# Patient Record
Sex: MALE | Race: WHITE | NOT HISPANIC OR LATINO | Employment: FULL TIME | URBAN - METROPOLITAN AREA
[De-identification: names, ages, dates, MRNs, and addresses within clinical notes are randomized per-mention and may not be internally consistent; named-entity substitution may affect disease eponyms.]

---
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
## 2021-01-17 ENCOUNTER — HOSPITAL ENCOUNTER (EMERGENCY)
Facility: CLINIC | Age: 21
Discharge: HOME | End: 2021-01-18
Attending: FAMILY MEDICINE | Admitting: FAMILY MEDICINE
Payer: COMMERCIAL

## 2021-01-17 ENCOUNTER — APPOINTMENT (OUTPATIENT)
Dept: CT IMAGING | Facility: CLINIC | Age: 21
End: 2021-01-17
Attending: FAMILY MEDICINE
Payer: COMMERCIAL

## 2021-01-17 DIAGNOSIS — G93.41 METABOLIC ENCEPHALOPATHY: ICD-10-CM

## 2021-01-17 DIAGNOSIS — F10.929 ALCOHOLIC INTOXICATION WITH COMPLICATION (H): ICD-10-CM

## 2021-01-17 DIAGNOSIS — R45.1 AGITATION: ICD-10-CM

## 2021-01-17 LAB
ALBUMIN SERPL-MCNC: 4.4 G/DL (ref 3.4–5)
ALBUMIN UR-MCNC: NEGATIVE MG/DL
ALP SERPL-CCNC: 65 U/L (ref 65–260)
ALT SERPL W P-5'-P-CCNC: 39 U/L (ref 0–50)
AMPHETAMINES UR QL SCN: NEGATIVE
ANION GAP SERPL CALCULATED.3IONS-SCNC: 6 MMOL/L (ref 3–14)
APPEARANCE UR: CLEAR
AST SERPL W P-5'-P-CCNC: 33 U/L (ref 0–35)
BARBITURATES UR QL: NEGATIVE
BASOPHILS # BLD AUTO: 0 10E9/L (ref 0–0.2)
BASOPHILS NFR BLD AUTO: 0.5 %
BENZODIAZ UR QL: NEGATIVE
BILIRUB SERPL-MCNC: 1.2 MG/DL (ref 0.2–1.3)
BILIRUB UR QL STRIP: NEGATIVE
BUN SERPL-MCNC: 9 MG/DL (ref 7–30)
CALCIUM SERPL-MCNC: 8.9 MG/DL (ref 8.5–10.1)
CANNABINOIDS UR QL SCN: NEGATIVE
CHLORIDE SERPL-SCNC: 110 MMOL/L (ref 98–110)
CO2 SERPL-SCNC: 26 MMOL/L (ref 20–32)
COCAINE UR QL: NEGATIVE
COLOR UR AUTO: ABNORMAL
CREAT SERPL-MCNC: 0.91 MG/DL (ref 0.5–1)
DIFFERENTIAL METHOD BLD: NORMAL
EOSINOPHIL # BLD AUTO: 0.1 10E9/L (ref 0–0.7)
EOSINOPHIL NFR BLD AUTO: 2 %
ERYTHROCYTE [DISTWIDTH] IN BLOOD BY AUTOMATED COUNT: 11.9 % (ref 10–15)
ETHANOL SERPL-MCNC: 0.27 G/DL
GFR SERPL CREATININE-BSD FRML MDRD: >90 ML/MIN/{1.73_M2}
GLUCOSE BLDC GLUCOMTR-MCNC: 78 MG/DL (ref 70–99)
GLUCOSE SERPL-MCNC: 95 MG/DL (ref 70–99)
GLUCOSE UR STRIP-MCNC: NEGATIVE MG/DL
HCT VFR BLD AUTO: 46.1 % (ref 40–53)
HGB BLD-MCNC: 15.7 G/DL (ref 13.3–17.7)
HGB UR QL STRIP: NEGATIVE
IMM GRANULOCYTES # BLD: 0 10E9/L (ref 0–0.4)
IMM GRANULOCYTES NFR BLD: 0.2 %
KETONES UR STRIP-MCNC: NEGATIVE MG/DL
LEUKOCYTE ESTERASE UR QL STRIP: NEGATIVE
LYMPHOCYTES # BLD AUTO: 1.7 10E9/L (ref 0.8–5.3)
LYMPHOCYTES NFR BLD AUTO: 30.8 %
MCH RBC QN AUTO: 30.3 PG (ref 26.5–33)
MCHC RBC AUTO-ENTMCNC: 34.1 G/DL (ref 31.5–36.5)
MCV RBC AUTO: 89 FL (ref 78–100)
MONOCYTES # BLD AUTO: 0.5 10E9/L (ref 0–1.3)
MONOCYTES NFR BLD AUTO: 8.9 %
NEUTROPHILS # BLD AUTO: 3.2 10E9/L (ref 1.6–8.3)
NEUTROPHILS NFR BLD AUTO: 57.6 %
NITRATE UR QL: NEGATIVE
NRBC # BLD AUTO: 0 10*3/UL
NRBC BLD AUTO-RTO: 0 /100
OPIATES UR QL SCN: NEGATIVE
PCP UR QL SCN: NEGATIVE
PH UR STRIP: 6 PH (ref 5–7)
PLATELET # BLD AUTO: 279 10E9/L (ref 150–450)
POTASSIUM SERPL-SCNC: 3.7 MMOL/L (ref 3.4–5.3)
PROT SERPL-MCNC: 8 G/DL (ref 6.8–8.8)
RBC # BLD AUTO: 5.18 10E12/L (ref 4.4–5.9)
SODIUM SERPL-SCNC: 142 MMOL/L (ref 133–144)
SOURCE: ABNORMAL
SP GR UR STRIP: 1 (ref 1–1.03)
TROPONIN I SERPL-MCNC: <0.015 UG/L (ref 0–0.04)
UROBILINOGEN UR STRIP-MCNC: 0 MG/DL (ref 0–2)
WBC # BLD AUTO: 5.6 10E9/L (ref 4–11)

## 2021-01-17 PROCEDURE — 80307 DRUG TEST PRSMV CHEM ANLYZR: CPT | Performed by: FAMILY MEDICINE

## 2021-01-17 PROCEDURE — 250N000011 HC RX IP 250 OP 636

## 2021-01-17 PROCEDURE — 82077 ASSAY SPEC XCP UR&BREATH IA: CPT | Performed by: FAMILY MEDICINE

## 2021-01-17 PROCEDURE — 96375 TX/PRO/DX INJ NEW DRUG ADDON: CPT | Performed by: FAMILY MEDICINE

## 2021-01-17 PROCEDURE — 85025 COMPLETE CBC W/AUTO DIFF WBC: CPT | Performed by: FAMILY MEDICINE

## 2021-01-17 PROCEDURE — 93010 ELECTROCARDIOGRAM REPORT: CPT | Performed by: FAMILY MEDICINE

## 2021-01-17 PROCEDURE — 72125 CT NECK SPINE W/O DYE: CPT

## 2021-01-17 PROCEDURE — 99291 CRITICAL CARE FIRST HOUR: CPT | Performed by: FAMILY MEDICINE

## 2021-01-17 PROCEDURE — 93005 ELECTROCARDIOGRAM TRACING: CPT | Performed by: FAMILY MEDICINE

## 2021-01-17 PROCEDURE — 99285 EMERGENCY DEPT VISIT HI MDM: CPT | Mod: 25 | Performed by: FAMILY MEDICINE

## 2021-01-17 PROCEDURE — 96374 THER/PROPH/DIAG INJ IV PUSH: CPT | Performed by: FAMILY MEDICINE

## 2021-01-17 PROCEDURE — 70450 CT HEAD/BRAIN W/O DYE: CPT

## 2021-01-17 PROCEDURE — 999N001017 HC STATISTIC GLUCOSE BY METER IP

## 2021-01-17 PROCEDURE — 84484 ASSAY OF TROPONIN QUANT: CPT | Performed by: FAMILY MEDICINE

## 2021-01-17 PROCEDURE — 81003 URINALYSIS AUTO W/O SCOPE: CPT | Performed by: FAMILY MEDICINE

## 2021-01-17 PROCEDURE — 80053 COMPREHEN METABOLIC PANEL: CPT | Performed by: FAMILY MEDICINE

## 2021-01-17 PROCEDURE — 99292 CRITICAL CARE ADDL 30 MIN: CPT | Mod: 25 | Performed by: FAMILY MEDICINE

## 2021-01-17 PROCEDURE — 250N000011 HC RX IP 250 OP 636: Performed by: FAMILY MEDICINE

## 2021-01-17 RX ORDER — DROPERIDOL 2.5 MG/ML
2.5 INJECTION, SOLUTION INTRAMUSCULAR; INTRAVENOUS ONCE
Status: COMPLETED | OUTPATIENT
Start: 2021-01-17 | End: 2021-01-17

## 2021-01-17 RX ORDER — LORAZEPAM 2 MG/ML
INJECTION INTRAMUSCULAR
Status: COMPLETED
Start: 2021-01-17 | End: 2021-01-17

## 2021-01-17 RX ORDER — LORAZEPAM 2 MG/ML
INJECTION INTRAMUSCULAR
Status: DISCONTINUED
Start: 2021-01-17 | End: 2021-01-17 | Stop reason: WASHOUT

## 2021-01-17 RX ORDER — LORAZEPAM 2 MG/ML
2 INJECTION INTRAMUSCULAR ONCE
Status: DISCONTINUED | OUTPATIENT
Start: 2021-01-17 | End: 2021-01-18 | Stop reason: HOSPADM

## 2021-01-17 RX ORDER — LORAZEPAM 2 MG/ML
2 INJECTION INTRAMUSCULAR ONCE
Status: COMPLETED | OUTPATIENT
Start: 2021-01-17 | End: 2021-01-17

## 2021-01-17 RX ADMIN — LORAZEPAM 2 MG: 2 INJECTION INTRAMUSCULAR at 20:15

## 2021-01-17 RX ADMIN — LORAZEPAM 2 MG: 2 INJECTION INTRAMUSCULAR; INTRAVENOUS at 20:15

## 2021-01-17 RX ADMIN — DROPERIDOL 2.5 MG: 2.5 INJECTION, SOLUTION INTRAMUSCULAR; INTRAVENOUS at 22:17

## 2021-01-18 VITALS
TEMPERATURE: 97.9 F | OXYGEN SATURATION: 90 % | DIASTOLIC BLOOD PRESSURE: 70 MMHG | HEART RATE: 107 BPM | SYSTOLIC BLOOD PRESSURE: 136 MMHG | RESPIRATION RATE: 17 BRPM

## 2021-01-18 LAB
ACETAMINOPHEN QUAL: NEGATIVE
AMOBARBITAL QUAL: NEGATIVE
BARBITAL QUAL: NEGATIVE
BUTABARBITAL QUAL: NEGATIVE
BUTALBITAL QUAL: NEGATIVE
CAFFEINE QUAL: POSITIVE
CARBAMAZEPINE QUAL: NEGATIVE
CARISOPRODOL QUAL: NEGATIVE
CHLORPROPAMIDE UR-MCNC: NEGATIVE UG/ML
DRUGS SERPL SCN: NEGATIVE
ETHCLORVYNOL QUAL: NEGATIVE
ETHINAMATE QUAL: NEGATIVE
ETHOSUXIMIDE QUAL: NEGATIVE
ETHOTOIN QUAL: NEGATIVE
GLUTETHIMIDE QUAL: NEGATIVE
IBUPROFEN QUAL: NEGATIVE
MEPHENYTOIN QUAL: NEGATIVE
MEPHOBARBITAL QUAL: NEGATIVE
MEPROBAMATE QUAL: NEGATIVE
METHAQUALONE QUAL: NEGATIVE
METHARBITAL QUAL: NEGATIVE
METHSUXIMIDE QUAL: NEGATIVE
METHYPRYLON QUAL: NEGATIVE
PENTOBARBITAL QUAL: NEGATIVE
PHENACETIN QUAL: NEGATIVE
PHENOBARBITAL QUAL: NEGATIVE
PHENSUXIMIDE QUAL: NEGATIVE
PHENYTOIN QUAL: NEGATIVE
PRIMIDONE QUAL: NEGATIVE
SALICYLATE QUAL: NEGATIVE
SECOBARBITAL QUAL: NEGATIVE
TALBUTAL QUAL: NEGATIVE
THEOPHYLLINE QUAL: NEGATIVE
THIOPENTAL QUAL: NEGATIVE
TYBAMATE QUAL: NEGATIVE
VALPROIC ACID QUAL: NEGATIVE

## 2021-01-18 PROCEDURE — 90791 PSYCH DIAGNOSTIC EVALUATION: CPT
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
## 2021-01-18 NOTE — ED PROVIDER NOTES
Emergency Department Psychiatric Patient Sign-out       Brief HPI:  This is a 20 year old male signed out to me by Dr. Cordero .  See initial ED Provider note for details of the presentation.  This is a patient who presented via EMS intoxicated and aggressive, received ketamine from EMS and lorazepam here as well as IV droperidol.  Very aggressive and lashing out at staff here initially, now sleeping comfortably.  CT of the head is negative for intracranial injury.  Reassuring vital signs and now.  The plan is to have DEC evaluation once clinically sober.    Patient is not medically cleared for admission to a Behavioral Health unit.      Pending studies include NA.      The patient is on a hold.  The type of hold is Transport.      The patient has required medication for agitation.    Medications   LORazepam (ATIVAN) injection 2 mg (2 mg Intravenous Not Given 1/17/21 2327)   LORazepam (ATIVAN) injection 2 mg (2 mg Intravenous Given 1/17/21 2015)   droperidol (INAPSINE) injection 2.5 mg (2.5 mg Intravenous Given 1/17/21 2217)       Exam:   Patient Vitals for the past 24 hrs:   BP Temp Temp src Pulse Resp SpO2   01/17/21 2300 102/42 -- -- 79 15 96 %   01/17/21 2245 107/45 -- -- 77 14 95 %   01/17/21 2230 125/59 -- -- 90 16 95 %   01/17/21 2200 116/77 -- -- 84 15 --   01/17/21 2145 115/78 -- -- 77 16 98 %   01/17/21 2130 114/73 -- -- 112 23 99 %   01/17/21 2120 -- -- -- 95 15 100 %   01/17/21 2115 (!) 124/97 -- -- 79 15 100 %   01/17/21 2100 120/68 -- -- 79 22 --   01/17/21 2045 128/75 -- -- 81 11 --   01/17/21 2036 108/82 97.9  F (36.6  C) Axillary 75 18 100 %   01/17/21 2030 120/67 -- -- 69 18 99 %   01/17/21 2025 127/85 -- -- 71 15 99 %   01/17/21 2010 108/82 -- -- 61 9 95 %         ED Course:    The patient was rechecked and reassessed multiple times.  He remained stable here and woke up early in the morning, was calm, cooperative, and appropriate here.  He denies headache, nausea, vomiting, chest pain,  difficulty breathing, or abdominal pain.  He is evaluated by DEC and at this point he denies suicidal ideation, homicidal ideation, and does not require inpatient psychiatric management.  He was counseled on alcohol cessation and if follow-up therapy appointment was scheduled for him.    Patient was signed out to the oncoming provider. Dr. Navarro.  He is safe to discharge as soon as he is able to find a ride.      Impression:    ICD-10-CM    1. Alcoholic intoxication with complication (H)  F10.929    2. Agitation  R45.1    3. Metabolic encephalopathy  G93.41        Plan:    1. Await Transfer to [facility]      RESULTS:   Results for orders placed or performed during the hospital encounter of 01/17/21 (from the past 24 hour(s))   Glucose by meter     Status: None    Collection Time: 01/17/21  8:16 PM   Result Value Ref Range    Glucose 78 70 - 99 mg/dL   CBC with platelets differential     Status: None    Collection Time: 01/17/21  8:17 PM   Result Value Ref Range    WBC 5.6 4.0 - 11.0 10e9/L    RBC Count 5.18 4.4 - 5.9 10e12/L    Hemoglobin 15.7 13.3 - 17.7 g/dL    Hematocrit 46.1 40.0 - 53.0 %    MCV 89 78 - 100 fl    MCH 30.3 26.5 - 33.0 pg    MCHC 34.1 31.5 - 36.5 g/dL    RDW 11.9 10.0 - 15.0 %    Platelet Count 279 150 - 450 10e9/L    Diff Method Automated Method     % Neutrophils 57.6 %    % Lymphocytes 30.8 %    % Monocytes 8.9 %    % Eosinophils 2.0 %    % Basophils 0.5 %    % Immature Granulocytes 0.2 %    Nucleated RBCs 0 0 /100    Absolute Neutrophil 3.2 1.6 - 8.3 10e9/L    Absolute Lymphocytes 1.7 0.8 - 5.3 10e9/L    Absolute Monocytes 0.5 0.0 - 1.3 10e9/L    Absolute Eosinophils 0.1 0.0 - 0.7 10e9/L    Absolute Basophils 0.0 0.0 - 0.2 10e9/L    Abs Immature Granulocytes 0.0 0 - 0.4 10e9/L    Absolute Nucleated RBC 0.0    Comprehensive metabolic panel     Status: None    Collection Time: 01/17/21  8:17 PM   Result Value Ref Range    Sodium 142 133 - 144 mmol/L    Potassium 3.7 3.4 - 5.3  mmol/L    Chloride 110 98 - 110 mmol/L    Carbon Dioxide 26 20 - 32 mmol/L    Anion Gap 6 3 - 14 mmol/L    Glucose 95 70 - 99 mg/dL    Urea Nitrogen 9 7 - 30 mg/dL    Creatinine 0.91 0.50 - 1.00 mg/dL    GFR Estimate >90 >60 mL/min/[1.73_m2]    GFR Estimate If Black >90 >60 mL/min/[1.73_m2]    Calcium 8.9 8.5 - 10.1 mg/dL    Bilirubin Total 1.2 0.2 - 1.3 mg/dL    Albumin 4.4 3.4 - 5.0 g/dL    Protein Total 8.0 6.8 - 8.8 g/dL    Alkaline Phosphatase 65 65 - 260 U/L    ALT 39 0 - 50 U/L    AST 33 0 - 35 U/L   Troponin I     Status: None    Collection Time: 01/17/21  8:17 PM   Result Value Ref Range    Troponin I ES <0.015 0.000 - 0.045 ug/L   Alcohol ethyl     Status: Abnormal    Collection Time: 01/17/21  8:17 PM   Result Value Ref Range    Ethanol g/dL 0.27 (H) <0.01 g/dL   UA reflex to Microscopic     Status: Abnormal    Collection Time: 01/17/21  8:18 PM   Result Value Ref Range    Color Urine Straw     Appearance Urine Clear     Glucose Urine Negative NEG^Negative mg/dL    Bilirubin Urine Negative NEG^Negative    Ketones Urine Negative NEG^Negative mg/dL    Specific Gravity Urine 1.002 (L) 1.003 - 1.035    Blood Urine Negative NEG^Negative    pH Urine 6.0 5.0 - 7.0 pH    Protein Albumin Urine Negative NEG^Negative mg/dL    Urobilinogen mg/dL 0.0 0.0 - 2.0 mg/dL    Nitrite Urine Negative NEG^Negative    Leukocyte Esterase Urine Negative NEG^Negative    Source Midstream Urine    Drug abuse screen urine     Status: None    Collection Time: 01/17/21  8:18 PM   Result Value Ref Range    Amphetamine Qual Urine Negative NEG^Negative    Barbiturates Qual Urine Negative NEG^Negative    Benzodiazepine Qual Urine Negative NEG^Negative    Cannabinoids Qual Urine Negative NEG^Negative    Cocaine Qual Urine Negative NEG^Negative    Opiates Qualitative Urine Negative NEG^Negative    PCP Qual Urine Negative NEG^Negative   CT Head w/o Contrast     Status: None    Collection Time: 01/17/21  9:14 PM    Narrative    EXAM: CT HEAD  W/O CONTRAST, CT CERVICAL SPINE W/O CONTRAST  LOCATION: [facility]  DATE/TIME: 1/17/2021 8:48 PM    INDICATION: Fall, delirium  COMPARISON: None.  TECHNIQUE:   1) Routine CT Head without IV contrast. Multiplanar reformats. Dose reduction techniques were used.  2) Routine CT Cervical Spine without IV contrast. Multiplanar reformats. Dose reduction techniques were used.    FINDINGS:   HEAD CT:   INTRACRANIAL CONTENTS: No intracranial hemorrhage, extraaxial collection, or mass effect.  No CT evidence of acute infarct. Normal parenchymal attenuation. Normal ventricles and sulci.     VISUALIZED ORBITS/SINUSES/MASTOIDS: No intraorbital abnormality. No paranasal sinus mucosal disease. No middle ear or mastoid effusion.    BONES/SOFT TISSUES: Suggestion of mild left parietal scalp swelling. No fracture.    CERVICAL SPINE CT:   VERTEBRA: Normal vertebral body heights and alignment. No fracture or posttraumatic subluxation. 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    CANAL/FORAMINA: No canal or neural foraminal stenosis.    PARASPINAL: No extraspinal abnormality. Visualized lung fields are clear.      Impression    IMPRESSION:  HEAD CT:  1.  Mild left parietal scalp swelling. No acute intracranial hemorrhage or calvarial fracture.    CERVICAL SPINE CT:  1.  No CT evidence for acute fracture or post traumatic subluxation.   Cervical spine CT w/o contrast     Status: None    Collection Time: 01/17/21  9:15 PM    Narrative    EXAM: CT HEAD W/O CONTRAST, CT CERVICAL SPINE W/O CONTRAST  LOCATION: [facility]  DATE/TIME: 1/17/2021 8:48 PM    INDICATION: Fall, delirium  COMPARISON: None.  TECHNIQUE:   1) Routine CT Head without IV contrast. Multiplanar reformats. Dose reduction techniques were used.  2) Routine CT Cervical Spine without IV contrast. Multiplanar reformats. Dose reduction techniques were used.    FINDINGS:   HEAD CT:   INTRACRANIAL CONTENTS: No intracranial  hemorrhage, extraaxial collection, or mass effect.  No CT evidence of acute infarct. Normal parenchymal attenuation. Normal ventricles and sulci.     VISUALIZED ORBITS/SINUSES/MASTOIDS: No intraorbital abnormality. No paranasal sinus mucosal disease. No middle ear or mastoid effusion.    BONES/SOFT TISSUES: Suggestion of mild left parietal scalp swelling. No fracture.    CERVICAL SPINE CT:   VERTEBRA: Normal vertebral body heights and alignment. No fracture or posttraumatic subluxation. 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    CANAL/FORAMINA: No canal or neural foraminal stenosis.    PARASPINAL: No extraspinal abnormality. Visualized lung fields are clear.      Impression    IMPRESSION:  HEAD CT:  1.  Mild left parietal scalp swelling. No acute intracranial hemorrhage or calvarial fracture.    CERVICAL SPINE CT:  1.  No CT evidence for acute fracture or post traumatic subluxation.         MD Re Mathews, Tye Wong MD  01/18/21 0605
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
## 2021-01-18 NOTE — DISCHARGE INSTRUCTIONS
Please meet with your primary care doctor to discuss substance abuse. If you feel that you are in need of psychiatric help, please feel free to come to the ED.     Please return to the ED if you develop tremors, vomiting, hallucinations, headache, numbness, tingling, SOB, chest pain, or confusion.     Stop drinking so much alcohol. Continued drinking of excessive alcohol will provide many ways for you to get very sick and/or die prematurely. These include but are not limited to a stomach or intestinal bleed from esophageal varices, gastritis, ulcer, or tear in your esophagus. This is often accompanied by vomiting blood and bloody diarrhea.     Excessive drinking very often leads to heart failure or liver failure, and premature death from those causes as well. Liver failure can also lead to hepatic encephalopathy which means you won't be able to think clearly and you may even be in a coma. Additionally, excessive alcohol can cause pancreatitis which is an extremely painful disease process with many potential terrible complications.     Additionally, being intoxicated makes you more vulnerable to being raped, robbed, mugged, assaulted, murdered, or do something that will require detention time. You are more likely to lose your possessions or have them stolen. If you are a male, your sexual performance will likely suffer and your testicles will decrease in size with continued alcohol abuse. With eventual liver failure, your blood can have impaired clotting and make you more prone to bleeding from your gastrointestinal tract or your head should you suffer any kind of head trauma. This can result in paralysis or death. Excessive alcohol use can contribute to seizures and seizure disorders. Having a seizure prevents you from driving a car for at least six months and can impair your ability to work.     Please consider cutting down or finding a treatment program to avoid these most unpleasant complications and/or dying much  earlier than you should. Healthy recommendations are 1 drink/day for women and 2 drinks/day for men with no binge drinking.

## 2021-01-18 NOTE — ED NOTES
Pt off restraints for 10 minutes while down at CT. Brief period of apnea. O2 applied. MD updated. Nasal trumpet insertion attempted. Pt resisted and resumed adequate respirations independetly.

## 2021-01-20 ENCOUNTER — NURSE TRIAGE (OUTPATIENT)
Dept: NURSING | Facility: CLINIC | Age: 21
End: 2021-01-20

## 2021-01-20 NOTE — TELEPHONE ENCOUNTER
Martir was in the ER on Sunday Jan 17th and today has questions on Follow up appointments.  FNA advised to telephone number on discharge papers.      COVID 19 Nurse Triage Plan/Patient Instructions    Please be aware that novel coronavirus (COVID-19) may be circulating in the community. If you develop symptoms such as fever, cough, or SOB or if you have concerns about the presence of another infection including coronavirus (COVID-19), please contact your health care provider or visit [email or web address].     Disposition/Instructions    Home care recommended. Follow home care protocol based instructions.    Thank you for taking steps to prevent the spread of this virus.  o Limit your contact with others.  o Wear a simple mask to cover your cough.  o Wash your hands well and often.    Resources    [facility]: About COVID-19: [email or web address]    CDC: What to Do If You're Sick: www.cdc.gov/coronavirus/2019-ncov/about/steps-when-sick.html    CDC: Ending Home Isolation: www.cdc.gov/coronavirus/2019-ncov/hcp/disposition-in-home-patients.html     CDC: Caring for Someone: www.cdc.gov/coronavirus/2019-ncov/if-you-are-sick/care-for-someone.html     [facility]: Interim Guidance for Hospital Discharge to Home: www.health.[facility].[place]/diseases/coronavirus/hcp/hospdischarge.pdf    [facility] clinical trials (COVID-19 research studies): [email or web address]-[email or web address]-trials     Below are the COVID-19 hotlines at the [facility] ([facility]). Interpreters are available.   o For health questions: Call 849-378-2851 or 1-619.110.2386 (7 a.m. to 7 p.m.)  o For questions about schools and childcare: Call 681-913-3372 or 1-603.898.7855 (7 a.m. to 7 p.m.)                       Additional Information    Negative: Caller requesting lab results and child stable    Negative: Caller has questions about durable medical equipment ordered and triager unable to answer    Negative: Requesting referral to a  specialist    Negative: Requesting regular office appointment and child is well    Health or general information question, no triage required and triager able to answer question    Protocols used: INFORMATION ONLY CALL - NO TRIAGE-P-[place]

## 2021-08-26 ENCOUNTER — TELEPHONE (OUTPATIENT)
Dept: EMERGENCY MEDICINE | Facility: CLINIC | Age: 21
End: 2021-08-26

## 2021-08-26 ENCOUNTER — HOSPITAL ENCOUNTER (EMERGENCY)
Facility: CLINIC | Age: 21
Discharge: HOME | End: 2021-08-26
Attending: PHYSICIAN ASSISTANT | Admitting: PHYSICIAN ASSISTANT
Payer: COMMERCIAL

## 2021-08-26 VITALS
RESPIRATION RATE: 16 BRPM | BODY MASS INDEX: 24.08 KG/M2 | SYSTOLIC BLOOD PRESSURE: 130 MMHG | TEMPERATURE: 98.6 F | WEIGHT: 180 LBS | DIASTOLIC BLOOD PRESSURE: 68 MMHG | HEART RATE: 53 BPM | OXYGEN SATURATION: 100 %

## 2021-08-26 DIAGNOSIS — Z20.822 EXPOSURE TO COVID-19 VIRUS: ICD-10-CM

## 2021-08-26 DIAGNOSIS — U07.1 INFECTION DUE TO 2019 NOVEL CORONAVIRUS: ICD-10-CM

## 2021-08-26 LAB — SARS-COV-2 RNA RESP QL NAA+PROBE: POSITIVE

## 2021-08-26 PROCEDURE — 99213 OFFICE O/P EST LOW 20 MIN: CPT | Performed by: PHYSICIAN ASSISTANT

## 2021-08-26 PROCEDURE — C9803 HOPD COVID-19 SPEC COLLECT: HCPCS | Performed by: PHYSICIAN ASSISTANT

## 2021-08-26 PROCEDURE — 87635 SARS-COV-2 COVID-19 AMP PRB: CPT | Performed by: PHYSICIAN ASSISTANT

## 2021-08-26 PROCEDURE — G0463 HOSPITAL OUTPT CLINIC VISIT: HCPCS | Performed by: PHYSICIAN ASSISTANT

## 2021-08-26 NOTE — ED PROVIDER NOTES
History     Chief Complaint   Patient presents with     Covid 19 Testing     work told pt to come in, no sx     HPI  Martir Angeles is a 20 year old male who presents for COVID-19 testing.  Patient states he was exposed to COVID-19 on his job.  He has had some mild symptoms over the past couple days including cough and runny nose.  Denies fevers, chills, rash, neck pain/stiffness, sore throat, sinus pressure, nasal congestion, nausea, vomiting, diarrhea, chest pain, or shortness of breath.  Patient has not received the COVID-19 vaccination.      Allergies:  No Known Allergies    Problem List:    There are no problems to display for this patient.       Past Medical History:    Past Medical History:   Diagnosis Date     Unspecified closed fracture of ankle 2004       Past Surgical History:    No past surgical history on file.    Family History:    Family History   Problem Relation Age of Onset     Hypertension Maternal Grandmother      Thyroid Disease Maternal Grandmother      Thyroid Disease Maternal Grandfather      Diabetes Maternal Grandfather        Social History:  Marital Status:  Single [1]  Social History     Tobacco Use     Smoking status: Passive Smoke Exposure - Never Smoker     Smokeless tobacco: Never Used     Tobacco comment: occ. exposure   Substance Use Topics     Alcohol use: No     Drug use: No        Medications:    clindamycin-benzoyl peroxide (BENZACLIN) 1-5 % external gel  doxycycline hyclate (VIBRAMYCIN) 50 MG capsule  Multiple Vitamins-Minerals (MULTIVITAMIN ADULTS 50+) TABS          Review of Systems   Constitutional: Negative.  Negative for fever.   HENT: Positive for rhinorrhea.    Respiratory: Positive for cough. Negative for shortness of breath.    Cardiovascular: Negative.  Negative for chest pain.   Musculoskeletal: Negative.    Skin: Negative.    All other systems reviewed and are negative.      Physical Exam   BP: 130/68  Pulse: 53  Temp: 98.6  F (37  C)  Resp: 16  Weight: 81.6 kg  (180 lb)  SpO2: 100 %      Physical Exam  Constitutional:       General: He is not in acute distress.     Appearance: Normal appearance. He is well-developed. He is not ill-appearing, toxic-appearing or diaphoretic.   HENT:      Head: Normocephalic and atraumatic.      Right Ear: Tympanic membrane, ear canal and external ear normal.      Left Ear: Tympanic membrane, ear canal and external ear normal.      Nose: Nose normal. No congestion or rhinorrhea.      Mouth/Throat:      Lips: Pink.      Mouth: Mucous membranes are moist.      Pharynx: Oropharynx is clear. Uvula midline. No pharyngeal swelling, oropharyngeal exudate, posterior oropharyngeal erythema or uvula swelling.      Tonsils: No tonsillar exudate or tonsillar abscesses.   Eyes:      Extraocular Movements: Extraocular movements intact.      Conjunctiva/sclera: Conjunctivae normal.      Pupils: Pupils are equal, round, and reactive to light.   Cardiovascular:      Rate and Rhythm: Normal rate and regular rhythm.      Pulses: Normal pulses.      Heart sounds: Normal heart sounds.   Pulmonary:      Effort: Pulmonary effort is normal. No respiratory distress.      Breath sounds: Normal breath sounds and air entry. No stridor, decreased air movement or transmitted upper airway sounds. No decreased breath sounds, wheezing, rhonchi or rales.   Chest:      Chest wall: No tenderness.   Musculoskeletal:         General: Normal range of motion.      Cervical back: Normal range of motion and neck supple. No rigidity.   Lymphadenopathy:      Cervical: No cervical adenopathy.   Skin:     General: Skin is warm and dry.      Capillary Refill: Capillary refill takes less than 2 seconds.      Findings: No rash.   Neurological:      Mental Status: He is alert and oriented to person, place, and time.      Sensory: Sensation is intact.      Motor: Motor function is intact.         ED Course        Procedures    No results found for this or any previous visit (from the past 24  hour(s)).     Results for orders placed or performed during the hospital encounter of 08/26/21   Symptomatic COVID-19 Virus (Coronavirus) by PCR Nasopharyngeal     Status: Abnormal    Specimen: Nasopharyngeal; Swab   Result Value Ref Range    SARS CoV2 PCR Positive (A) Negative    Narrative    Testing was performed using the migel  SARS-CoV-2 & Influenza A/B Assay on the migel  Latricia  System.  This test should be ordered for the detection of SARS-COV-2 in individuals who meet SARS-CoV-2 clinical and/or epidemiological criteria. Test performance is unknown in asymptomatic patients.  This test is for in vitro diagnostic use under the FDA EUA for laboratories certified under CLIA to perform moderate and/or high complexity testing. This test has not been FDA cleared or approved.  A negative test does not rule out the presence of PCR inhibitors in the specimen or target RNA in concentration below the limit of detection for the assay. The possibility of a false negative should be considered if the patient's recent exposure or clinical presentation suggests COVID-19.  [facility] are certified under the Clinical Laboratory Improvement Amendments of 1988 (CLIA-88) as qualified to perform moderate and/or high complexity laboratory testing.       Medications - No data to display    Assessments & Plan (with Medical Decision Making)     Pt is a 20 year old male who presents for COVID-19 testing.  Patient states he was exposed to COVID-19 on his job.  He has had some mild symptoms over the past couple days including cough and runny nose.  Patient has not received the COVID-19 vaccination.     Pt is afebrile on arrival.  Exam as above.  COVID-19 testing was obtained and was positive.  Discussed results with patient.  Patient is not hypoxic.  O2 sats of 100% on room air.  Patient is in no respiratory distress.  Lungs are clear to auscultation.  Encouraged symptomatic treatments at home.  Get well loop referral  was placed.  Return precautions were reviewed.  Hand-outs were provided.    Patient was instructed to follow-up with PCP virtually in 3-5 days for continued care and management or sooner if new or worsening symptoms.  He is to return to the ED for persistent and/or worsening symptoms.  Patient expressed understanding of the diagnosis and plan and was discharged home in good condition.    I have reviewed the nursing notes.    I have reviewed the findings, diagnosis, plan and need for follow up with the patient.    Discharge Medication List as of 8/26/2021  6:01 PM          Final diagnoses:   Exposure to COVID-19 virus   Infection due to 2019 novel coronavirus       8/26/2021   [facility] EMERGENCY DEPT\      Disclaimer:  This note consists of symbols derived from keyboarding, dictation and/or voice recognition software.  As a result, there may be errors in the script that have gone undetected.  Please consider this when interpreting information found in this chart.     Gerri Post PA-C  08/28/21 2970

## 2021-08-26 NOTE — TELEPHONE ENCOUNTER
"-Coronavirus (COVID-19) Notification    Spoke to pt, when asked to verify demographics as usual verification process before beginning reason for calling, he become irritated and asked \"can I ask why I need to verify information?\" sounded very rude, not wanting to communicate. After verifying information, even though he had given me a different address 1st, he was able to verify the one we have on file, I mentioned to him about testing pos. He replies with \"I KNOW THAT! I have the piece of paper right next to me.\" When asked about if protocol was shared w/ him as to what he does since he tested pos for cov, he replies \"yep, stay by myself don't go anywhere so I don't get others sick.\" I added for a total of 10d, since he is asx, day 1 would be today. Pt sounded very frustrated the more I spoke to him, after him telling me he doesn't have any questions, I mentioned that I didn't want to keep talking to him since he sounds like he has the information he needs, he thanked me and hung up.    Caller Name (Patient, parent, daughter/son, grandparent, etc)  Pt    Reason for call  Notify of Positive Coronavirus (COVID-19) lab results, assess symptoms,  review [facility] recommendations    Lab Result    Lab test:  2019-nCoV rRt-PCR or SARS-CoV-2 PCR    Oropharyngeal AND/OR nasopharyngeal swabs is POSITIVE for 2019-nCoV RNA/SARS-COV-2 PCR (COVID-19 virus)    RN Recommendations/Instructions per [facility] Coronavirus COVID-19 recommendations    Brief introduction script  Introduce self then review script:  \"I am calling on behalf of [employer].  We were notified that your Coronavirus test (COVID-19) for was POSITIVE for the virus.  I have some information to relay to you but first I wanted to mention that the [place] Dept of Health will be contacting you shortly [it's possible [facility] already called Patient] to talk to you more about how you are feeling and other people you have had contact with who might now also " "have the virus.  Also, [facility] is Partnering with the [facility] for Covid-19 research, you may be contacted directly by research staff.\"    Assessment (Inquire about Patient's current symptoms)   Assessment   Current Symptoms at time of phone call: (if no symptoms, document No symptoms] none   Symptoms onset (if applicable) n/a     If at time of call, Patients symptoms hare worsened, the Patient should contact 911 or have someone drive them to Emergency Dept promptly:      If Patient calling 911, inform 911 personal that you have tested positive for the Coronavirus (COVID-19).  Place mask on and await 911 to arrive.    If Emergency Dept, If possible, please have another adult drive you to the Emergency Dept but you need to wear mask when in contact with other people.      Monoclonal Antibody Administration    You may be eligible to receive a new treatment with a monoclonal antibody for preventing hospitalization in patients at high risk for complications from COVID-19.   This medication is still experimental and available on a limited basis; it is given through an IV and must be given at an infusion center. Please note that not all people who are eligible will receive the medication since it is in limited supply.     Are you interested in being considered for this medication?  No.   Does the patient fit the criteria: No asx    If patient qualifies based on above criteria:  \"You will be contacted if you are selected to receive this treatment in the next 1-2 business days.   This is time sensitive and if you are not selected in the next 1-2 business days, you will not receive the medication.  If you do not receive a call to schedule, you have not been selected.\"      Review information with Patient    Your result was positive. This means you have COVID-19 (coronavirus).  We have sent you a letter that reviews the information that I'll be reviewing with you now.    How can I protect others?    If you " have symptoms: stay home and away from others (self-isolate) until:    You've had no fever--and no medicine that reduces fever--for 1 full day (24 hours). And       Your other symptoms have gotten better. For example, your cough or breathing has improved. And     At least 10 days have passed since your symptoms started. (If you've been told by a doctor that you have a weak immune system, wait 20 days.)     If you don't have symptoms: Stay home and away from others (self-isolate) until at least 10 days have passed since your first positive COVID-19 test. (Date test collected)    During this time:    Stay in your own room, including for meals. Use your own bathroom if you can.    Stay away from others in your home. No hugging, kissing or shaking hands. No visitors.     Don't go to work, school or anywhere else.     Clean  high touch  surfaces often (doorknobs, counters, handles, etc.). Use a household cleaning spray or wipes. You'll find a full list on the EPA website at www.epa.gov/pesticide-registration/list-n-disinfectants-use-against-sars-cov-2.     Cover your mouth and nose with a mask, tissue or other face covering to avoid spreading germs.    Wash your hands and face often with soap and water.    Make a list of people you have been in close contact with recently, even if either of you wore a face covering.   ; Start your list from 2 days before you became ill or had a positive test.  ; Include anyone that was within 6 feet of you for a cumulative total of 15 minutes or more in 24 hours. (Example: if you sat next to Suhail for 5 minutes in the morning and 10 minutes in the afternoon, then you were in close contact for 15 minutes total that day. Suhail would be added to your list.)    A public health worker will call or text you. It is important that you answer. They will ask you questions about possible exposures to COVID-19, such as people you have been in direct contact with and places you have visited.    Tell  the people on your list that you have COVID-19; they should stay away from others for 14 days starting from the last time they were in contact with you (unless you are told something different from a public health worker).     Caregivers in these groups are at risk for severe illness due to COVID-19:  o People 65 years and older  o People who live in a nursing home or long-term care facility  o People with chronic disease (lung, heart, cancer, diabetes, kidney, liver, immunologic)  o People who have a weakened immune system, including those who:  - Are in cancer treatment  - Take medicine that weakens the immune system, such as corticosteroids  - Had a bone marrow or organ transplant  - Have an immune deficiency  - Have poorly controlled HIV or AIDS  - Are obese (body mass index of 40 or higher)  - Smoke regularly    Caregivers should wear gloves while washing dishes, handling laundry and cleaning bedrooms and bathrooms.    Wash and dry laundry with special caution. Don't shake dirty laundry, and use the warmest water setting you can.    If you have a weakened immune system, ask your doctor about other actions you should take.    For more tips, go to www.cdc.gov/coronavirus/2019-ncov/downloads/10Things.pdf.    You should not go back to work until you meet the guidelines above for ending your home isolation. You don't need to be retested for COVID-19 before going back to work--studies show that you won't spread the virus if it's been at least 10 days since your symptoms started (or 20 days, if you have a weak immune system).    Employers: This document serves as formal notice of your employee's medical guidelines for going back to work. They must meet the above guidelines before going back to work in person.    How can I take care of myself?    1. Get lots of rest. Drink extra fluids (unless a doctor has told you not to).    2. Take Tylenol (acetaminophen) for fever or pain. If you have liver or kidney problems, ask  your family doctor if it's okay to take Tylenol.     Take either:     650 mg (two 325 mg pills) every 4 to 6 hours, or     1,000 mg (two 500 mg pills) every 8 hours as needed.     Note: Don't take more than 3,000 mg in one day. Acetaminophen is found in many medicines (both prescribed and over-the-counter medicines). Read all labels to be sure you don't take too much.    For children, check the Tylenol bottle for the right dose (based on their age or weight).    3. If you have other health problems (like cancer, heart failure, an organ transplant or severe kidney disease): Call your specialty clinic if you don't feel better in the next 2 days.    4. Know when to call 911: Emergency warning signs include:    Trouble breathing or shortness of breath    Pain or pressure in the chest that doesn't go away    Feeling confused like you haven't felt before, or not being able to wake up    Bluish-colored lips or face    5. Sign up for [employer]. We know it's scary to hear that you have COVID-19. We want to track your symptoms to make sure you're okay over the next 2 weeks. Please look for an email from [employer]--this is a free, online program that we'll use to keep in touch. To sign up, follow the link in the email. Learn more at [email or web address].    Where can I get more information?    [facility]: [email or web address]    Coronavirus Basics: www.health.[facility].[place]/diseases/coronavirus/basics.html    What to Do If You're Sick: www.cdc.gov/coronavirus/2019-ncov/about/steps-when-sick.html    Ending Home Isolation: www.cdc.gov/coronavirus/2019-ncov/hcp/disposition-in-home-patients.html     Caring for Someone with COVID-19: www.cdc.gov/coronavirus/2019-ncov/if-you-are-sick/care-for-someone.html     [facility] clinical trials (COVID-19 research studies): [email or web address]-[email or web address]-trials     A Positive COVID-19 letter will be sent via [employer] or the mail. (Exception, no  letters sent to Presurgerical/Preprocedure Patients)    Zenaida Oakes

## 2021-08-26 NOTE — LETTER
August 26, 2021      To Whom It May Concern:      Martir Angeles was seen in our Emergency Department today, 08/26/21.  Patient is positive COVID-19.  Please excuse patient from work.  Thank you.      Sincerely,        Gerri Post PA-C

## 2021-08-28 ASSESSMENT — ENCOUNTER SYMPTOMS
RHINORRHEA: 1
MUSCULOSKELETAL NEGATIVE: 1
CARDIOVASCULAR NEGATIVE: 1
CONSTITUTIONAL NEGATIVE: 1
FEVER: 0
SHORTNESS OF BREATH: 0
COUGH: 1

## 2022-12-09 ENCOUNTER — HOSPITAL ENCOUNTER (EMERGENCY)
Facility: CLINIC | Age: 22
Discharge: HOME | End: 2022-12-09
Attending: PHYSICIAN ASSISTANT | Admitting: PHYSICIAN ASSISTANT
Payer: COMMERCIAL

## 2022-12-09 VITALS
RESPIRATION RATE: 20 BRPM | SYSTOLIC BLOOD PRESSURE: 162 MMHG | WEIGHT: 180 LBS | BODY MASS INDEX: 23.86 KG/M2 | TEMPERATURE: 100.3 F | DIASTOLIC BLOOD PRESSURE: 65 MMHG | HEART RATE: 86 BPM | OXYGEN SATURATION: 95 % | HEIGHT: 73 IN

## 2022-12-09 DIAGNOSIS — J02.9 ACUTE PHARYNGITIS: ICD-10-CM

## 2022-12-09 DIAGNOSIS — Z20.818 STREP THROAT EXPOSURE: ICD-10-CM

## 2022-12-09 DIAGNOSIS — R05.9 COUGH: ICD-10-CM

## 2022-12-09 DIAGNOSIS — J10.1 INFLUENZA A: ICD-10-CM

## 2022-12-09 LAB
DEPRECATED S PYO AG THROAT QL EIA: NEGATIVE
FLUAV RNA SPEC QL NAA+PROBE: POSITIVE
FLUBV RNA RESP QL NAA+PROBE: NEGATIVE
SARS-COV-2 RNA RESP QL NAA+PROBE: NEGATIVE

## 2022-12-09 PROCEDURE — C9803 HOPD COVID-19 SPEC COLLECT: HCPCS | Performed by: PHYSICIAN ASSISTANT

## 2022-12-09 PROCEDURE — 87651 STREP A DNA AMP PROBE: CPT | Performed by: PHYSICIAN ASSISTANT

## 2022-12-09 PROCEDURE — 99213 OFFICE O/P EST LOW 20 MIN: CPT | Mod: CS | Performed by: PHYSICIAN ASSISTANT

## 2022-12-09 PROCEDURE — G0463 HOSPITAL OUTPT CLINIC VISIT: HCPCS | Performed by: PHYSICIAN ASSISTANT

## 2022-12-09 PROCEDURE — 87636 SARSCOV2 & INF A&B AMP PRB: CPT | Performed by: PHYSICIAN ASSISTANT

## 2022-12-09 RX ORDER — OSELTAMIVIR PHOSPHATE 75 MG/1
75 CAPSULE ORAL 2 TIMES DAILY
Qty: 10 CAPSULE | Refills: 0 | Status: SHIPPED | OUTPATIENT
Start: 2022-12-09 | End: 2022-12-14

## 2022-12-09 ASSESSMENT — ENCOUNTER SYMPTOMS
FEVER: 1
SORE THROAT: 1
COUGH: 1
CHILLS: 1

## 2022-12-09 NOTE — ED PROVIDER NOTES
"  History     Chief Complaint   Patient presents with     Pharyngitis     Pt reports sore throat since Wednesday, pt reports he was exposed to strep by co worker     HPI  Martir Angeles is a 22 year old male who presents with complaints of sore throat for the past 2 days.  Pt was exposed to strep throat at work.  Patient also complains of subjective fevers and chills.  The patient has also had a cough and congestion.  Denies nausea, vomiting, diarrhea, abdominal pain, chest pain, or shortness of breath.      Allergies:  No Known Allergies    Problem List:    There are no problems to display for this patient.       Past Medical History:    Past Medical History:   Diagnosis Date     Unspecified closed fracture of ankle 2004       Past Surgical History:    No past surgical history on file.    Family History:    Family History   Problem Relation Age of Onset     Hypertension Maternal Grandmother      Thyroid Disease Maternal Grandmother      Thyroid Disease Maternal Grandfather      Diabetes Maternal Grandfather        Social History:  Marital Status:  Single [1]  Social History     Tobacco Use     Smoking status: Passive Smoke Exposure - Never Smoker     Smokeless tobacco: Never     Tobacco comments:     occ. exposure   Substance Use Topics     Alcohol use: No     Drug use: No        Medications:    oseltamivir (TAMIFLU) 75 MG capsule  clindamycin-benzoyl peroxide (BENZACLIN) 1-5 % external gel  doxycycline hyclate (VIBRAMYCIN) 50 MG capsule  Multiple Vitamins-Minerals (MULTIVITAMIN ADULTS 50+) TABS          Review of Systems   Constitutional: Positive for chills and fever.   HENT: Positive for congestion and sore throat.    Respiratory: Positive for cough.    All other systems reviewed and are negative.      Physical Exam   BP: (!) 162/65  Pulse: 86  Temp: 100.3  F (37.9  C)  Resp: 20  Height: 185.4 cm (6' 1\")  Weight: 81.6 kg (180 lb)  SpO2: 95 %      Physical Exam  Constitutional:       General: He is not in acute " distress.     Appearance: Normal appearance. He is well-developed and well-nourished. He is not ill-appearing, toxic-appearing or diaphoretic.   HENT:      Head: Normocephalic and atraumatic.      Right Ear: Tympanic membrane, ear canal and external ear normal.      Left Ear: Tympanic membrane, ear canal and external ear normal.      Nose: Mucosal edema and rhinorrhea present.      Mouth/Throat:      Mouth: Mucous membranes are normal.      Pharynx: Posterior oropharyngeal erythema present. No oropharyngeal exudate, posterior oropharyngeal edema or uvula swelling.      Tonsils: No tonsillar abscesses.   Eyes:      Extraocular Movements: EOM normal.      Conjunctiva/sclera: Conjunctivae normal.      Pupils: Pupils are equal, round, and reactive to light.   Cardiovascular:      Rate and Rhythm: Normal rate and regular rhythm.      Heart sounds: Normal heart sounds.   Pulmonary:      Effort: Pulmonary effort is normal. No respiratory distress.      Breath sounds: Normal breath sounds. No wheezing or rales.   Musculoskeletal:      Cervical back: Full passive range of motion without pain, normal range of motion and neck supple. No rigidity. Normal range of motion.   Lymphadenopathy:      Cervical: No cervical adenopathy.   Skin:     General: Skin is warm and dry.   Neurological:      Mental Status: He is alert and oriented to person, place, and time.   Psychiatric:         Behavior: Behavior is cooperative.         ED Course                 Procedures      Results for orders placed or performed during the hospital encounter of 12/09/22 (from the past 24 hour(s))   Streptococcus A Rapid Scr w Reflx to PCR    Specimen: Throat; Swab   Result Value Ref Range    Group A Strep antigen Negative Negative   Group A Streptococcus PCR Throat Swab    Specimen: Throat; Swab   Result Value Ref Range    Group A strep by PCR Not Detected Not Detected    Narrative    The Xpert Xpress Strep A test, performed on the [employer]  Instrument  Systems, is a rapid, qualitative in vitro diagnostic test for the detection of Streptococcus pyogenes (Group A ß-hemolytic Streptococcus, Strep A) in throat swab specimens from patients with signs and symptoms of pharyngitis. The Xpert Xpress Strep A test can be used as an aid in the diagnosis of Group A Streptococcal pharyngitis. The assay is not intended to monitor treatment for Group A Streptococcus infections. The Xpert Xpress Strep A test utilizes an automated real-time polymerase chain reaction (PCR) to detect Streptococcus pyogenes DNA.   Symptomatic Influenza A/B & SARS-CoV2 (COVID-19) Virus PCR Multiplex Nasopharyngeal    Specimen: Nasopharyngeal; Swab   Result Value Ref Range    Influenza A PCR Positive (A) Negative    Influenza B PCR Negative Negative    SARS CoV2 PCR Negative Negative    Narrative    Testing was performed using the migel SARS-CoV-2 & Influenza A/B Assay on the migel Latricia System. This test should be ordered for the detection of SARS-CoV-2 and influenza viruses in individuals who meet clinical and/or epidemiological criteria. Test performance is unknown in asymptomatic patients. This test is for in vitro diagnostic use under the FDA EUA for laboratories certified under CLIA to perform moderate and/or high complexity testing. This test has not been FDA cleared or approved. A negative result does not rule out the presence of PCR inhibitors in the specimen or target RNA in concentration below the limit of detection for the assay. If only one viral target is positive but coinfection with multiple targets is suspected, the sample should be re-tested with another FDA cleared, approved or authorized test, if coinfection would change clinical management. [facility] are certified under the Clinical Laboratory Improvement Amendments of 1988 (CLIA-88) as qualified to perform moderate and/or high complexity laboratory testing.       Medications - No data to display    Assessments &  Plan (with Medical Decision Making)     Pt is a 22 year old male who presents with complaints of sore throat for the past 2 days.  Pt was exposed to strep throat at work.  Patient also complains of subjective fevers and chills.  The patient has also had a cough and congestion.      Pt is afebrile on arrival.  Exam as above.  Patient is not hypoxic.  Rapid strep is negative.  Culture is pending.  Influenza A was positive.  COVID-19 was negative.  Discussed results with patient.  Discussed treatment options with patient.  Patient is otherwise healthy.  We discussed risks versus benefits of treating with Tamiflu.  He is presenting within the recommended window for treatment.  Patient prefers to treat with Tamiflu.  Encouraged additional symptomatic treatments at home.  Return precautions were reviewed.  Hand-outs were provided.    Patient was sent with Tamiflu and was instructed to follow-up with PCP in 3-5 days for continued care and management or sooner if new or worsening symptoms.  He is to return to the ED for persistent and/or worsening symptoms.  Patient expressed understanding of the diagnosis and plan and was discharged home in good condition.    I have reviewed the nursing notes.    I have reviewed the findings, diagnosis, plan and need for follow up with the patient.    Discharge Medication List as of 12/9/2022  6:03 PM          Final diagnoses:   Acute pharyngitis   Strep throat exposure   Cough   Influenza A       12/9/2022   [facility] EMERGENCY DEPT      Disclaimer:  This note consists of symbols derived from keyboarding, dictation and/or voice recognition software.  As a result, there may be errors in the script that have gone undetected.  Please consider this when interpreting information found in this chart.     Gerri Post PA-C  12/10/22 0856

## 2022-12-10 LAB — GROUP A STREP BY PCR: NOT DETECTED

## 2023-06-28 ENCOUNTER — HOSPITAL ENCOUNTER (EMERGENCY)
Facility: CLINIC | Age: 23
Discharge: HOME | End: 2023-06-28
Attending: PHYSICIAN ASSISTANT | Admitting: PHYSICIAN ASSISTANT
Payer: COMMERCIAL

## 2023-06-28 VITALS
HEART RATE: 72 BPM | TEMPERATURE: 98.5 F | SYSTOLIC BLOOD PRESSURE: 119 MMHG | DIASTOLIC BLOOD PRESSURE: 65 MMHG | OXYGEN SATURATION: 100 % | RESPIRATION RATE: 16 BRPM

## 2023-06-28 DIAGNOSIS — R21 RASH AND NONSPECIFIC SKIN ERUPTION: ICD-10-CM

## 2023-06-28 DIAGNOSIS — Z11.3 SCREEN FOR STD (SEXUALLY TRANSMITTED DISEASE): ICD-10-CM

## 2023-06-28 PROCEDURE — 87591 N.GONORRHOEAE DNA AMP PROB: CPT | Performed by: PHYSICIAN ASSISTANT

## 2023-06-28 PROCEDURE — 87491 CHLMYD TRACH DNA AMP PROBE: CPT | Performed by: PHYSICIAN ASSISTANT

## 2023-06-28 PROCEDURE — 87529 HSV DNA AMP PROBE: CPT | Performed by: PHYSICIAN ASSISTANT

## 2023-06-28 PROCEDURE — G0463 HOSPITAL OUTPT CLINIC VISIT: HCPCS | Performed by: PHYSICIAN ASSISTANT

## 2023-06-28 PROCEDURE — 99212 OFFICE O/P EST SF 10 MIN: CPT | Performed by: PHYSICIAN ASSISTANT

## 2023-06-28 ASSESSMENT — ENCOUNTER SYMPTOMS
MUSCULOSKELETAL NEGATIVE: 1
NEUROLOGICAL NEGATIVE: 1
CONSTITUTIONAL NEGATIVE: 1
CARDIOVASCULAR NEGATIVE: 1
RESPIRATORY NEGATIVE: 1

## 2023-06-28 NOTE — ED PROVIDER NOTES
History     Chief Complaint   Patient presents with     Rash     HPI  Martir Angeles is a 22 year old male who presents for evaluation of a rash in the  area which she first noticed yesterday.  He is unsure of when the rash actually began but first noticed it yesterday.  He noticed a vesicle on the shaft of the penis when assessing the rash yesterday, nonpainful.  He denies any discharge from the penis, no pain with urination, no urgency or frequency.  No bowel concerns.  No abdominal pain, no chest discomfort or shortness of breath, no rash on the face or drainage from the eyes.  States that last intercourse was about 6 months ago with 1 female partner.  Had 1 female partner over the past 12 months. Has had oral sex and vaginal sex, has used a condom with vaginal sex.  No previous treatments or exposures to STDs that he knows of.  Wants to rule out STDs today.    Allergies:  No Known Allergies    Problem List:    There are no problems to display for this patient.       Past Medical History:    Past Medical History:   Diagnosis Date     Unspecified closed fracture of ankle 2004       Past Surgical History:    No past surgical history on file.    Family History:    Family History   Problem Relation Age of Onset     Hypertension Maternal Grandmother      Thyroid Disease Maternal Grandmother      Thyroid Disease Maternal Grandfather      Diabetes Maternal Grandfather        Social History:  Marital Status:  Single [1]  Social History     Tobacco Use     Smoking status: Passive Smoke Exposure - Never Smoker     Smokeless tobacco: Never     Tobacco comments:     occ. exposure   Substance Use Topics     Alcohol use: No     Drug use: No        Medications:    clindamycin-benzoyl peroxide (BENZACLIN) 1-5 % external gel  doxycycline hyclate (VIBRAMYCIN) 50 MG capsule  Multiple Vitamins-Minerals (MULTIVITAMIN ADULTS 50+) TABS          Review of Systems   Constitutional: Negative.    Respiratory: Negative.     Cardiovascular: Negative.    Musculoskeletal: Negative.    Skin: Positive for rash.   Neurological: Negative.        Physical Exam   BP: 119/65  Pulse: 72  Temp: 98.5  F (36.9  C)  Resp: 16  SpO2: 100 %      Physical Exam  Constitutional:       General: He is not in acute distress.     Appearance: Normal appearance. He is not ill-appearing, toxic-appearing or diaphoretic.   HENT:      Head: Normocephalic and atraumatic.   Genitourinary:     Pubic Area: No rash.       Penis: Normal and circumcised.       Testes: Normal.         Right: Mass, tenderness, swelling, testicular hydrocele or varicocele not present. Right testis is descended.         Left: Mass, tenderness, swelling, testicular hydrocele or varicocele not present. Left testis is descended.      Epididymis:      Right: Normal.      Left: Normal.          Comments: Erythematous confluent rash at the location shown in the diagram above.  Rash does not titus to pressure, is nontender.  No vesicular lesions present.  Neurological:      Mental Status: He is alert.         ED Course                 Procedures            No results found for this or any previous visit (from the past 24 hour(s)).    Medications - No data to display    Assessments & Plan (with Medical Decision Making)     The patient is a 22-year-old male who presents for evaluation of a rash in the  area, concern for HSV primarily today.  Has no concerns with urination, no abdominal pain, no nausea or vomiting, no bowel concerns.  No known exposures to STDs.    Apply bacitracin to the affected area twice per day.  Pending results for HSV, gonorrhea and chlamydia.  Will treat if positive.    Seek urgent medical evaluation if you develop acutely worsening rash, pain with urination, swelling and pain in the groin area, abdominal pain, nausea or vomiting, or new onset discharge from the penis.  I have reviewed the nursing notes.    I have reviewed the findings, diagnosis, plan and need for follow  up with the patient.    New Prescriptions    No medications on file       Final diagnoses:   Screen for STD (sexually transmitted disease)   Rash and nonspecific skin eruption       6/28/2023   [facility] EMERGENCY DEPT     Suhail Griggs PA-C  06/28/23 [id number]

## 2023-06-28 NOTE — ED TRIAGE NOTES
Pt reports rash on his genital area, denies any itching and pain. Pt states that he noticed it yesterday

## 2023-06-28 NOTE — DISCHARGE INSTRUCTIONS
Seek urgent medical evaluation if you develop acutely worsening rash, pain with urination, swelling and pain in the groin area, abdominal pain, nausea or vomiting, or new onset discharge from the penis.

## 2023-06-29 ENCOUNTER — TELEPHONE (OUTPATIENT)
Dept: EMERGENCY MEDICINE | Facility: CLINIC | Age: 23
End: 2023-06-29
Payer: COMMERCIAL

## 2023-06-29 LAB
C TRACH DNA SPEC QL NAA+PROBE: NEGATIVE
HSV1 DNA SPEC QL NAA+PROBE: NOT DETECTED
HSV2 DNA SPEC QL NAA+PROBE: NOT DETECTED
N GONORRHOEA DNA SPEC QL NAA+PROBE: NEGATIVE

## 2023-06-29 NOTE — RESULT ENCOUNTER NOTE
Final result for Herpes Simplex Virus 1 PCR and Herpes Simplex Virus 2 PCR are both NEGATIVE.    No change in treatment per [facility] ED Lab Result Herpes Simplex Virus Protocol

## 2023-06-29 NOTE — TELEPHONE ENCOUNTER
[facility] Emergency Department Lab result notification     Caller/Patient name  Martir    Reason for call  Patient requesting lab result    Lab Result  Component      Latest Ref Rng 6/28/2023  6:11 PM 6/28/2023  6:15 PM   HSV Type 1 PCR      Not Detected   Not Detected    HSV Type 2 PCR      Not Detected   Not Detected    Chlamydia Trachomatis PCR      Negative  Negative     N Gonorrhea PCR      Negative  Negative       Recommendations/Instructions  Notified of results    Angus Wylie RN  [facility]  Emergency Dept Lab Result RN  # [phone]

## 2023-06-29 NOTE — RESULT ENCOUNTER NOTE
Final result for both N. Gonorrhoeae PCR and Chlamydia Trachomatis PCR are NEGATIVE.  No treatment or change in treatment per [facility] ED Lab Result N. Gonorrhea AND/OR Chlamydia T. protocol.

## 2023-10-21 ENCOUNTER — HOSPITAL ENCOUNTER (EMERGENCY)
Facility: CLINIC | Age: 23
Discharge: HOME | End: 2023-10-21
Attending: PHYSICIAN ASSISTANT | Admitting: PHYSICIAN ASSISTANT
Payer: COMMERCIAL

## 2023-10-21 VITALS
OXYGEN SATURATION: 99 % | HEIGHT: 74 IN | SYSTOLIC BLOOD PRESSURE: 124 MMHG | RESPIRATION RATE: 18 BRPM | BODY MASS INDEX: 22.46 KG/M2 | DIASTOLIC BLOOD PRESSURE: 73 MMHG | HEART RATE: 56 BPM | TEMPERATURE: 97 F | WEIGHT: 175 LBS

## 2023-10-21 DIAGNOSIS — J02.9 ACUTE PHARYNGITIS: ICD-10-CM

## 2023-10-21 LAB
FLUAV RNA SPEC QL NAA+PROBE: NEGATIVE
FLUBV RNA RESP QL NAA+PROBE: NEGATIVE
GROUP A STREP BY PCR: NOT DETECTED
RSV RNA SPEC NAA+PROBE: NEGATIVE
SARS-COV-2 RNA RESP QL NAA+PROBE: NEGATIVE

## 2023-10-21 PROCEDURE — G0463 HOSPITAL OUTPT CLINIC VISIT: HCPCS | Performed by: PHYSICIAN ASSISTANT

## 2023-10-21 PROCEDURE — 87637 SARSCOV2&INF A&B&RSV AMP PRB: CPT | Performed by: PHYSICIAN ASSISTANT

## 2023-10-21 PROCEDURE — 99213 OFFICE O/P EST LOW 20 MIN: CPT | Performed by: PHYSICIAN ASSISTANT

## 2023-10-21 PROCEDURE — 87651 STREP A DNA AMP PROBE: CPT | Performed by: PHYSICIAN ASSISTANT

## 2023-10-21 PROCEDURE — G0463 HOSPITAL OUTPT CLINIC VISIT: HCPCS

## 2023-10-21 ASSESSMENT — ENCOUNTER SYMPTOMS
CONSTITUTIONAL NEGATIVE: 1
CARDIOVASCULAR NEGATIVE: 1
SORE THROAT: 1
GASTROINTESTINAL NEGATIVE: 1
COUGH: 1
RHINORRHEA: 1

## 2023-10-21 ASSESSMENT — ACTIVITIES OF DAILY LIVING (ADL): ADLS_ACUITY_SCORE: 35

## 2023-10-21 NOTE — ED PROVIDER NOTES
History     Chief Complaint   Patient presents with    URI    Pharyngitis     HPI  Martir Angeles is a 22 year old male with a past medical history of tobacco use and alcohol use, frostbite, and metabolic encephalopathy, Behcet's disease who presents for evaluation of sore throat which began 2 to 3 days ago.  He has some congestion and runny nose, has a chronic cough productive of colored phlegm.  However he has a chronic cough at baseline.  He has taken a single dose of DayQuil 1 day ago which has not helped improve the sore throat.  Primarily wants to rule out strep pharyngitis today.  States that he has strep pharyngitis about this per year.  No chest pain or shortness of breath currently, no concerns with breathing or swallowing, no abdominal pain, nausea vomiting, rashes or fevers.  No known exposures to illness.  States that he does share a vape pen with others so could have been exposed to something.    Allergies:  No Known Allergies    Problem List:    There are no problems to display for this patient.       Past Medical History:    Past Medical History:   Diagnosis Date    Unspecified closed fracture of ankle 2004       Past Surgical History:    No past surgical history on file.    Family History:    Family History   Problem Relation Age of Onset    Hypertension Maternal Grandmother     Thyroid Disease Maternal Grandmother     Thyroid Disease Maternal Grandfather     Diabetes Maternal Grandfather        Social History:  Marital Status:  Single [1]  Social History     Tobacco Use    Smoking status: Passive Smoke Exposure - Never Smoker    Smokeless tobacco: Never    Tobacco comments:     occ. exposure   Substance Use Topics    Alcohol use: No    Drug use: No        Medications:    clindamycin-benzoyl peroxide (BENZACLIN) 1-5 % external gel  doxycycline hyclate (VIBRAMYCIN) 50 MG capsule  Multiple Vitamins-Minerals (MULTIVITAMIN ADULTS 50+) TABS          Review of Systems   Constitutional: Negative.   "  HENT:  Positive for congestion, rhinorrhea and sore throat.    Respiratory:  Positive for cough.    Cardiovascular: Negative.    Gastrointestinal: Negative.    Genitourinary: Negative.        Physical Exam   BP: 124/73  Pulse: 56  Temp: 97  F (36.1  C)  Resp: 18  Height: 188 cm (6' 2\")  Weight: 79.4 kg (175 lb)  SpO2: 99 %      Physical Exam  Vitals reviewed.   Constitutional:       General: He is not in acute distress.     Appearance: Normal appearance. He is not ill-appearing, toxic-appearing or diaphoretic.   HENT:      Head: Normocephalic and atraumatic.      Right Ear: Tympanic membrane, ear canal and external ear normal. There is no impacted cerumen.      Left Ear: Tympanic membrane, ear canal and external ear normal. There is no impacted cerumen.      Nose: Nose normal. No congestion or rhinorrhea.      Mouth/Throat:      Mouth: Mucous membranes are moist. No oral lesions.      Pharynx: Oropharynx is clear. Posterior oropharyngeal erythema present. No pharyngeal swelling, oropharyngeal exudate or uvula swelling.      Tonsils: No tonsillar exudate or tonsillar abscesses.   Cardiovascular:      Rate and Rhythm: Normal rate and regular rhythm.      Pulses: Normal pulses.      Heart sounds: Normal heart sounds. No murmur heard.  Pulmonary:      Effort: Pulmonary effort is normal. No respiratory distress.      Breath sounds: Normal breath sounds. No stridor. No wheezing, rhonchi or rales.   Chest:      Chest wall: No tenderness.   Musculoskeletal:         General: Normal range of motion.      Cervical back: Normal range of motion and neck supple. No rigidity. No muscular tenderness.   Lymphadenopathy:      Cervical: No cervical adenopathy.   Skin:     General: Skin is warm and dry.   Neurological:      Mental Status: He is alert and oriented to person, place, and time.         ED Course                 Procedures            Results for orders placed or performed during the hospital encounter of 10/21/23 (from " the past 24 hour(s))   Group A Streptococcus PCR Throat Swab    Specimen: Throat; Swab   Result Value Ref Range    Group A strep by PCR Not Detected Not Detected    Narrative    The Xpert Xpress Strep A test, performed on the [employer]  [employer]ument Systems, is a rapid, qualitative in vitro diagnostic test for the detection of Streptococcus pyogenes (Group A ß-hemolytic Streptococcus, Strep A) in throat swab specimens from patients with signs and symptoms of pharyngitis. The Xpert Xpress Strep A test can be used as an aid in the diagnosis of Group A Streptococcal pharyngitis. The assay is not intended to monitor treatment for Group A Streptococcus infections. The Xpert Xpress Strep A test utilizes an automated real-time polymerase chain reaction (PCR) to detect Streptococcus pyogenes DNA.   Symptomatic Influenza A/B, RSV, & SARS-CoV2 PCR (COVID-19) Nasopharyngeal    Specimen: Nasopharyngeal; Swab   Result Value Ref Range    Influenza A PCR Negative Negative    Influenza B PCR Negative Negative    RSV PCR Negative Negative    SARS CoV2 PCR Negative Negative    Narrative    Testing was performed using the Xpert Xpress CoV2/Flu/RSV Assay on the [employer] Instrument. This test should be ordered for the detection of SARS-CoV-2, influenza, and RSV viruses in individuals who meet clinical and/or epidemiological criteria. Test performance is unknown in asymptomatic patients. This test is for in vitro diagnostic use under the FDA EUA for laboratories certified under CLIA to perform high or moderate complexity testing. This test has not been FDA cleared or approved. A negative result does not rule out the presence of PCR inhibitors in the specimen or target RNA in concentration below the limit of detection for the assay. If only one viral target is positive but coinfection with multiple targets is suspected, the sample should be re-tested with another FDA cleared, approved, or authorized test, if coinfection would  change clinical management. This test was validated by the [facility]. These laboratories are certified under the Clinical Laboratory Improvement Amendments of 1988 (CLIA-88) as qualified to perform high complexity laboratory testing.       Medications - No data to display    Assessments & Plan (with Medical Decision Making)     Pt having symptoms of a viral URI.  Negative test results for strep pharyngitis today.  Negative results for influenza, RSV and COVID-19 today.  No worrisome findings on physical exam, vital signs are within normal limits and reassuring.    Symptomatic cares include: pushing fluids, rest, OTC cold medication as needed. For the sore throat patient can use salt water gargles, cough drops, chloraseptic spray/drops and tylenol/ibuprofen. Follow up with PCP if no improvement in 4 to 5 days.     Seek urgent medical evaluation if there are new or worsening symptoms such as fever of 104 degrees F or greater, chest tightness, wheezing, chest pain, shortness of breath, facial pressure, severe headaches, trouble breathing, trouble swallowing, severe or worsening nausea/vomiting, or severe abdominal pain.     Pt/guardian verbalized understanding and agrees with the treatment plan.        I have reviewed the nursing notes.    I have reviewed the findings, diagnosis, plan and need for follow up with the patient.      Discharge Medication List as of 10/21/2023  1:43 PM          Final diagnoses:   Acute pharyngitis       10/21/2023   [facility] EMERGENCY DEPT       Suhail Griggs PA-C  10/21/23 8550

## 2023-10-21 NOTE — ED TRIAGE NOTES
Pt here with sore throat and URI symptoms for past couple of days.      Triage Assessment (Adult)       Row Name 10/21/23 1257          Triage Assessment    Airway WDL WDL        Respiratory WDL    Respiratory WDL cough     Cough Type congested;productive        Skin Circulation/Temperature WDL    Skin Circulation/Temperature WDL WDL        Cardiac WDL    Cardiac WDL WDL        Peripheral/Neurovascular WDL    Peripheral Neurovascular WDL WDL        Cognitive/Neuro/Behavioral WDL    Cognitive/Neuro/Behavioral WDL WDL

## 2023-10-21 NOTE — DISCHARGE INSTRUCTIONS
Symptomatic cares include: pushing fluids, rest, OTC cold medication as needed. For the sore throat patient can use salt water gargles, cough drops, chloraseptic spray/drops and tylenol/ibuprofen. Follow up with PCP if no improvement in 4 to 5 days.     Seek urgent medical evaluation if there are new or worsening symptoms such as fever of 104 degrees F or greater, chest tightness, wheezing, chest pain, shortness of breath, facial pressure, severe headaches, trouble breathing, trouble swallowing, severe or worsening nausea/vomiting, or severe abdominal pain.

## 2024-05-30 ENCOUNTER — HOSPITAL ENCOUNTER (EMERGENCY)
Facility: CLINIC | Age: 24
Discharge: HOME | End: 2024-05-30
Attending: EMERGENCY MEDICINE | Admitting: EMERGENCY MEDICINE
Payer: COMMERCIAL

## 2024-05-30 VITALS
RESPIRATION RATE: 18 BRPM | HEART RATE: 72 BPM | WEIGHT: 185 LBS | OXYGEN SATURATION: 97 % | TEMPERATURE: 98.9 F | SYSTOLIC BLOOD PRESSURE: 143 MMHG | BODY MASS INDEX: 23.75 KG/M2 | DIASTOLIC BLOOD PRESSURE: 84 MMHG

## 2024-05-30 DIAGNOSIS — H57.11 ACUTE RIGHT EYE PAIN: ICD-10-CM

## 2024-05-30 DIAGNOSIS — Z18.10 RETAINED METAL FRAGMENT FOREIGN BODY: ICD-10-CM

## 2024-05-30 PROCEDURE — 250N000009 HC RX 250: Performed by: FAMILY MEDICINE

## 2024-05-30 PROCEDURE — 99283 EMERGENCY DEPT VISIT LOW MDM: CPT | Performed by: EMERGENCY MEDICINE

## 2024-05-30 PROCEDURE — 99283 EMERGENCY DEPT VISIT LOW MDM: CPT

## 2024-05-30 PROCEDURE — 250N000009 HC RX 250: Performed by: EMERGENCY MEDICINE

## 2024-05-30 RX ORDER — TETRACAINE HYDROCHLORIDE 5 MG/ML
1-2 SOLUTION OPHTHALMIC ONCE
Status: COMPLETED | OUTPATIENT
Start: 2024-05-30 | End: 2024-05-30

## 2024-05-30 RX ORDER — OFLOXACIN 3 MG/ML
1 SOLUTION/ DROPS OPHTHALMIC ONCE
Status: COMPLETED | OUTPATIENT
Start: 2024-05-30 | End: 2024-05-30

## 2024-05-30 RX ADMIN — TETRACAINE HYDROCHLORIDE 2 DROP: 5 SOLUTION OPHTHALMIC at 20:26

## 2024-05-30 RX ADMIN — OFLOXACIN 1 DROP: 3 SOLUTION OPHTHALMIC at 22:04

## 2024-05-30 ASSESSMENT — COLUMBIA-SUICIDE SEVERITY RATING SCALE - C-SSRS
2. HAVE YOU ACTUALLY HAD ANY THOUGHTS OF KILLING YOURSELF IN THE PAST MONTH?: NO
1. IN THE PAST MONTH, HAVE YOU WISHED YOU WERE DEAD OR WISHED YOU COULD GO TO SLEEP AND NOT WAKE UP?: NO
6. HAVE YOU EVER DONE ANYTHING, STARTED TO DO ANYTHING, OR PREPARED TO DO ANYTHING TO END YOUR LIFE?: NO

## 2024-05-30 ASSESSMENT — VISUAL ACUITY
OS: 20/50;WITHOUT CORRECTIVE LENSES
OD: 20/25;WITHOUT CORRECTIVE LENSES

## 2024-05-30 ASSESSMENT — ACTIVITIES OF DAILY LIVING (ADL)
ADLS_ACUITY_SCORE: 35
ADLS_ACUITY_SCORE: 35

## 2024-05-30 NOTE — LETTER
May 30, 2024      To Whom It May Concern:      Martir Angeles was seen in our Emergency Department today, 05/30/24.  I expect his condition to improve over the next 1 days.  He may return to work/school when improved.    Sincerely,        Arielle Pimentel RN

## 2024-05-31 ENCOUNTER — PATIENT OUTREACH (OUTPATIENT)
Dept: FAMILY MEDICINE | Facility: CLINIC | Age: 24
End: 2024-05-31
Payer: COMMERCIAL

## 2024-05-31 NOTE — ED TRIAGE NOTES
"Right eye pain and feels like \"something in eye\".  Patient was at work and not wearing safety glasses and thinks he may have gotten metal or another foreign object in eye.     Triage Assessment (Adult)       Row Name 05/30/24 2012          Triage Assessment    Airway WDL WDL        Respiratory WDL    Respiratory WDL WDL        Skin Circulation/Temperature WDL    Skin Circulation/Temperature WDL WDL        Cardiac WDL    Cardiac WDL WDL        Peripheral/Neurovascular WDL    Peripheral Neurovascular WDL WDL        Cognitive/Neuro/Behavioral WDL    Cognitive/Neuro/Behavioral WDL WDL                     "

## 2024-05-31 NOTE — ED PROVIDER NOTES
History     Chief Complaint   Patient presents with    Foreign Body in Eye     HPI  Martir Angeles is a 23 year old male with acute onset pain in his right eye while working on a .  Was shaving something metal and felt something hit his eye.  Has had moderate pain and light sensitivity.  And bright lights says he can see a bundle band of light around the object.  No reported change in vision otherwise.  Does not wear contact lenses.    The patient's PMHx, Surgical Hx, Allergies, and Medications were all reviewed with the patient.    Allergies:  No Known Allergies    Problem List:    There are no problems to display for this patient.       Past Medical History:    Past Medical History:   Diagnosis Date    Unspecified closed fracture of ankle 2004       Past Surgical History:    No past surgical history on file.    Family History:    Family History   Problem Relation Age of Onset    Hypertension Maternal Grandmother     Thyroid Disease Maternal Grandmother     Thyroid Disease Maternal Grandfather     Diabetes Maternal Grandfather        Social History:  Marital Status:  Single [1]  Social History     Tobacco Use    Smoking status: Passive Smoke Exposure - Never Smoker    Smokeless tobacco: Never    Tobacco comments:     occ. exposure   Substance Use Topics    Alcohol use: No    Drug use: No        Medications:    clindamycin-benzoyl peroxide (BENZACLIN) 1-5 % external gel  doxycycline hyclate (VIBRAMYCIN) 50 MG capsule  Multiple Vitamins-Minerals (MULTIVITAMIN ADULTS 50+) TABS          Review of Systems  Pertinent positives and negatives mentioned in HPI    Physical Exam   BP: (!) 143/84  Pulse: 72  Temp: 98.9  F (37.2  C)  Resp: 18  Weight: 83.9 kg (185 lb)  SpO2: 97 %    GEN: Awake, alert, and cooperative.   EYES: EOM intact. Conjunctiva clear. No discharge.  Visual acuity right 20/25 (affected eye), left 20/50' pupils equal round reactive.  No RAPD.  Small amount of fluorescein uptake at 5 o'clock position  inferior to the cornea.  Epithelial defect over cornea without fluorescein uptake or New sign with bright flash like a mere during slit-lamp exam.  Concern for partially embedded foreign body that is not at the surface.  No cell or flare.  No hyphema.  NECK: Symmetric, freely mobile.     ED Course        Procedures                 Critical Care time:  none               No results found for this or any previous visit (from the past 24 hour(s)).    Medications   tetracaine (PONTOCAINE) 0.5 % ophthalmic solution 1-2 drop (2 drops Right Eye $Given 5/30/24 2026)   ofloxacin (OCUFLOX) 0.3 % ophthalmic solution 1 drop (1 drop Right Eye $Given 5/30/24 2204)       Assessments & Plan (with Medical Decision Making)   23 year old male with right eye pain while grinding metal as detailed in HPI.  Exam as above and concern for embedded foreign body in cornea.  I do not think this represents a globe rupture.  [facility] who recommends that he is seen by his partner Dr. Chaves in office tomorrow.  Patient was provided with phone number and Ocuflox drops 1 every 4 hours.  ED return precautions discussed.  Did discuss that it is possible that unable to be removed in office and may need transfer to hospital.  Patient will take tomorrow off from work.         I have reviewed the nursing notes.         Discharge Medication List as of 5/30/2024 10:16 PM          Final diagnoses:   Acute right eye pain   Retained metal fragment foreign body     Johnnie Humphrey MD        5/30/2024   [facility] EMERGENCY DEPT    Disclaimer: This note consists of words and symbols derived from keyboarding and dictation using voice recognition software.  As a result, there may be errors that have gone undetected.  Please consider this when interpreting information found in this note.               Johnnie Humphrey MD  05/30/24 6436

## 2024-05-31 NOTE — DISCHARGE INSTRUCTIONS
Take ocuflox every 4 hours.    [facility] first thing tomorrow morning. Tell them that you were seen in the Emergency Department this evening and Dr. Ugarte wanted you to be seen by Dr. Chaves on 5/31/24 to see if metallic foreign body can be removed in the office.     If your symptoms worsen or you develop new or concerning symptoms, please return to the Emergency Department for further evaluation and treatment.

## 2024-05-31 NOTE — TELEPHONE ENCOUNTER
"Matrir states he just got back from the eye Dr and they did not see anything in the eye today. He was told to keep his eye lubricated and if he feels any foreign object to try and make note of where he felt it and return to the eye Dr. He has their phone number.       Transitions of Care Outreach  Chief Complaint   Patient presents with    ER F/U       Most Recent Admission Date: 5/30/2024   Most Recent Admission Diagnosis:      Most Recent Discharge Date: 5/30/2024   Most Recent Discharge Diagnosis: Acute right eye pain - H57.11  Retained metal fragment foreign body - Z18.10     Transitions of Care Assessment    Discharge Assessment  How are you doing now that you are home?: \"I am doing fine\"  How are your symptoms? (Red Flag symptoms escalate to triage hotline per guidelines): Improved  Do you know how to contact your clinic care team if you have future questions or changes to your health status? : Yes  Does the patient have their discharge instructions? : No - Review discharge instructions  Does the patient have questions regarding their discharge instructions? : No  Were you started on any new medications or were there changes to any of your previous medications? : Yes  Does the patient have all of their medications?: Yes  Do you have questions regarding any of your medications? : No  Do you have all of your needed medical supplies or equipment (DME)?  (i.e. oxygen tank, CPAP, cane, etc.): Yes (NA)    Follow up Plan     Discharge Follow-Up  Discharge follow up appointment scheduled in alignment with recommended follow up timeframe or Transitions of Risk Category? (Low = within 30 days; Moderate= within 14 days; High= within 7 days): Yes  Discharge Follow Up Appointment Date: 05/31/24  Discharge Follow Up Appointment Scheduled with?: Specialty Care Provider    No future appointments.    Outpatient Plan as outlined on AVS reviewed with patient.    For any urgent concerns, please contact our 24 hour nurse triage " line: [phone] ([phone])       Latonia Hernandez RN

## 2024-12-13 ENCOUNTER — HOSPITAL ENCOUNTER (EMERGENCY)
Facility: CLINIC | Age: 24
Discharge: HOME | End: 2024-12-13
Attending: PHYSICIAN ASSISTANT | Admitting: PHYSICIAN ASSISTANT
Payer: COMMERCIAL

## 2024-12-13 VITALS
HEART RATE: 74 BPM | RESPIRATION RATE: 16 BRPM | OXYGEN SATURATION: 99 % | SYSTOLIC BLOOD PRESSURE: 131 MMHG | TEMPERATURE: 97.6 F | DIASTOLIC BLOOD PRESSURE: 49 MMHG

## 2024-12-13 DIAGNOSIS — J02.9 ACUTE PHARYNGITIS: ICD-10-CM

## 2024-12-13 LAB — S PYO DNA THROAT QL NAA+PROBE: NOT DETECTED

## 2024-12-13 PROCEDURE — G0463 HOSPITAL OUTPT CLINIC VISIT: HCPCS | Performed by: PHYSICIAN ASSISTANT

## 2024-12-13 PROCEDURE — 87651 STREP A DNA AMP PROBE: CPT | Performed by: PHYSICIAN ASSISTANT

## 2024-12-13 PROCEDURE — 99213 OFFICE O/P EST LOW 20 MIN: CPT | Performed by: PHYSICIAN ASSISTANT

## 2024-12-13 ASSESSMENT — COLUMBIA-SUICIDE SEVERITY RATING SCALE - C-SSRS
1. IN THE PAST MONTH, HAVE YOU WISHED YOU WERE DEAD OR WISHED YOU COULD GO TO SLEEP AND NOT WAKE UP?: NO
6. HAVE YOU EVER DONE ANYTHING, STARTED TO DO ANYTHING, OR PREPARED TO DO ANYTHING TO END YOUR LIFE?: NO
2. HAVE YOU ACTUALLY HAD ANY THOUGHTS OF KILLING YOURSELF IN THE PAST MONTH?: NO

## 2024-12-13 ASSESSMENT — ENCOUNTER SYMPTOMS
CONSTITUTIONAL NEGATIVE: 1
HEADACHES: 1
FEVER: 0
SORE THROAT: 1

## 2024-12-13 NOTE — ED PROVIDER NOTES
History   No chief complaint on file.    HPI  Martir Angeles is a 24 year old male who presents to Urgent Care with complaints of sore throat.  Patient wanting to rule out strep throat today.  He states his wife noticed a white patch to the back of the throat this morning.  Also complains of headache.  Denies fevers, chills, rash, neck pain/stiffness, cough, sinus pressure, nasal congestion, nausea, vomiting, diarrhea, abdominal pain, chest pain, or shortness of breath.  Reports multiple ill contacts.      Allergies:  No Known Allergies    Problem List:    There are no active problems to display for this patient.       Past Medical History:    Past Medical History:   Diagnosis Date    Unspecified closed fracture of ankle 2004       Past Surgical History:    No past surgical history on file.    Family History:    Family History   Problem Relation Age of Onset    Hypertension Maternal Grandmother     Thyroid Disease Maternal Grandmother     Thyroid Disease Maternal Grandfather     Diabetes Maternal Grandfather        Social History:  Marital Status:  Single [1]  Social History     Tobacco Use    Smoking status: Passive Smoke Exposure - Never Smoker    Smokeless tobacco: Never    Tobacco comments:     occ. exposure   Substance Use Topics    Alcohol use: No    Drug use: No        Medications:    clindamycin-benzoyl peroxide (BENZACLIN) 1-5 % external gel  doxycycline hyclate (VIBRAMYCIN) 50 MG capsule  Multiple Vitamins-Minerals (MULTIVITAMIN ADULTS 50+) TABS          Review of Systems   Constitutional: Negative.  Negative for fever.   HENT:  Positive for sore throat.    Neurological:  Positive for headaches.   All other systems reviewed and are negative.      Physical Exam   BP: 131/49  Pulse: 74  Temp: 97.6  F (36.4  C)  Resp: 16  SpO2: 99 %      Physical Exam  Constitutional:       General: He is not in acute distress.     Appearance: Normal appearance. He is well-developed. He is not ill-appearing, toxic-appearing  or diaphoretic.   HENT:      Head: Normocephalic and atraumatic.      Right Ear: Tympanic membrane, ear canal and external ear normal.      Left Ear: Tympanic membrane, ear canal and external ear normal.      Nose: Nose normal. No congestion or rhinorrhea.      Mouth/Throat:      Lips: Pink.      Mouth: Mucous membranes are moist.      Pharynx: Uvula midline. Posterior oropharyngeal erythema present. No pharyngeal swelling, oropharyngeal exudate, uvula swelling or postnasal drip.      Tonsils: No tonsillar exudate or tonsillar abscesses.      Comments: Erythematous ulcerative lesion to left oropharynx   Eyes:      Extraocular Movements: Extraocular movements intact.      Conjunctiva/sclera: Conjunctivae normal.      Pupils: Pupils are equal, round, and reactive to light.   Cardiovascular:      Rate and Rhythm: Normal rate and regular rhythm.      Heart sounds: Normal heart sounds.   Pulmonary:      Effort: Pulmonary effort is normal. No respiratory distress.      Breath sounds: Normal breath sounds. No stridor. No wheezing, rhonchi or rales.   Musculoskeletal:         General: Normal range of motion.      Cervical back: Full passive range of motion without pain, normal range of motion and neck supple. No rigidity. Normal range of motion.   Lymphadenopathy:      Cervical: Cervical adenopathy present.   Skin:     General: Skin is warm and dry.   Neurological:      Mental Status: He is alert and oriented to person, place, and time.   Psychiatric:         Behavior: Behavior is cooperative.         ED Course        Procedures      Results for orders placed or performed during the hospital encounter of 12/13/24 (from the past 24 hours)   Group A Streptococcus PCR Throat Swab    Specimen: Throat; Swab   Result Value Ref Range    Group A strep by PCR Not Detected Not Detected    Narrative    The Xpert Xpress Strep A test, performed on the [employer], is a rapid, qualitative in vitro diagnostic test for  the detection of Streptococcus pyogenes (Group A ß-hemolytic Streptococcus, Strep A) in throat swab specimens from patients with signs and symptoms of pharyngitis. The Xpert Xpress Strep A test can be used as an aid in the diagnosis of Group A Streptococcal pharyngitis. The assay is not intended to monitor treatment for Group A Streptococcus infections. The Xpert Xpress Strep A test utilizes an automated real-time polymerase chain reaction (PCR) to detect Streptococcus pyogenes DNA.       Medications - No data to display    Assessments & Plan (with Medical Decision Making)     Pt is a 24 year old male who presents to Urgent Care with complaints of sore throat.  Patient wanting to rule out strep throat today.  He states his wife noticed a white patch to the back of the throat this morning.  Also complains of headache.      Pt is afebrile on arrival.  Exam as above.  Strep testing was negative.  Discussed results with patient.  Suspect viral etiology especially given appearance of oropharyngeal ulcerative lesion.  Encouraged symptomatic treatments at home.  Return precautions were reviewed.  Hand-outs were provided.    Patient was instructed to follow-up with PCP for continued care and management.  He is to return to the ED for persistent and/or worsening symptoms.  Patient expressed understanding of the diagnosis and plan and was discharged home in good condition.    I have reviewed the nursing notes.    I have reviewed the findings, diagnosis, plan and need for follow up with the patient.    Discharge Medication List as of 12/13/2024 12:57 PM          Final diagnoses:   Acute pharyngitis       12/13/2024   [facility] EMERGENCY DEPT      Disclaimer:  This note consists of symbols derived from keyboarding, dictation and/or voice recognition software.  As a result, there may be errors in the script that have gone undetected.  Please consider this when interpreting information found in this chart.      Gerri Post PA-C  12/13/24 1406       Gerri Post PA-C  12/13/24 1400

## 2024-12-13 NOTE — LETTER
December 13, 2024      To Whom It May Concern:      Martir Angeles was seen in our Urgent Care today, 12/13/24.  Please excuse from work--patient has a viral illness/sore throat.  Thank you.      Sincerely,        Gerri Post PA-C

## 2025-01-14 ENCOUNTER — HOSPITAL ENCOUNTER (EMERGENCY)
Facility: CLINIC | Age: 25
Discharge: HOME | End: 2025-01-14
Attending: EMERGENCY MEDICINE | Admitting: EMERGENCY MEDICINE
Payer: COMMERCIAL

## 2025-01-14 VITALS
WEIGHT: 180 LBS | OXYGEN SATURATION: 97 % | HEART RATE: 77 BPM | BODY MASS INDEX: 23.1 KG/M2 | SYSTOLIC BLOOD PRESSURE: 145 MMHG | DIASTOLIC BLOOD PRESSURE: 58 MMHG | TEMPERATURE: 97.9 F | HEIGHT: 74 IN | RESPIRATION RATE: 18 BRPM

## 2025-01-14 DIAGNOSIS — J02.9 ACUTE PHARYNGITIS, UNSPECIFIED ETIOLOGY: ICD-10-CM

## 2025-01-14 LAB — S PYO DNA THROAT QL NAA+PROBE: NOT DETECTED

## 2025-01-14 PROCEDURE — 99283 EMERGENCY DEPT VISIT LOW MDM: CPT | Performed by: EMERGENCY MEDICINE

## 2025-01-14 PROCEDURE — 87651 STREP A DNA AMP PROBE: CPT | Performed by: EMERGENCY MEDICINE

## 2025-01-14 ASSESSMENT — ACTIVITIES OF DAILY LIVING (ADL): ADLS_ACUITY_SCORE: 46

## 2025-01-14 NOTE — ED TRIAGE NOTES
"Pt reports sore throat started this morning. Pt only wants a strep swab done. Work sent him home.      Triage Assessment (Adult)       Row Name 01/14/25 0815          Triage Assessment    Airway WDL WDL        Respiratory WDL    Respiratory WDL X;cough  \"a small cough\"        Cardiac WDL    Cardiac WDL WDL        Cognitive/Neuro/Behavioral WDL    Cognitive/Neuro/Behavioral WDL WDL                     "

## 2025-01-14 NOTE — DISCHARGE INSTRUCTIONS
Your strep testing was negative. Likely caused by a self limiting viral illness.     Recommend 600 mg of ibuprofen and 1000 mg of acetaminophen three times per day as needed for pain.     If your symptoms worsen or you develop new or concerning symptoms, please return to the Emergency Department for further evaluation and treatment.

## 2025-01-14 NOTE — ED PROVIDER NOTES
"  History     Chief Complaint   Patient presents with    Pharyngitis     Woke up today     HPI  Martir Angeles is a 24 year old male with 1 day of sore throat reports that he was sent home from work to have strep testing done.  Reports some mild bodyaches went to the gym yesterday.  No fevers.  No nausea or vomiting.  No cough.  A little bit of nasal congestion present.  Says he works as a  and often they have the large doors open and gets very cold in the shop and frequently will get runny noses with this.    The patient's PMHx, Surgical Hx, Allergies, and Medications were all reviewed with the patient.    Allergies:  No Known Allergies    Problem List:    There are no active problems to display for this patient.       Past Medical History:    Past Medical History:   Diagnosis Date    Unspecified closed fracture of ankle 2004       Past Surgical History:    No past surgical history on file.    Family History:    Family History   Problem Relation Age of Onset    Hypertension Maternal Grandmother     Thyroid Disease Maternal Grandmother     Thyroid Disease Maternal Grandfather     Diabetes Maternal Grandfather        Social History:  Marital Status:  Single [1]  Social History     Tobacco Use    Smoking status: Passive Smoke Exposure - Never Smoker    Smokeless tobacco: Never    Tobacco comments:     occ. exposure   Substance Use Topics    Alcohol use: No    Drug use: No        Medications:    clindamycin-benzoyl peroxide (BENZACLIN) 1-5 % external gel  doxycycline hyclate (VIBRAMYCIN) 50 MG capsule  Multiple Vitamins-Minerals (MULTIVITAMIN ADULTS 50+) TABS          Review of Systems  Pertinent positives and negatives mentioned in HPI    Physical Exam   BP: (!) 145/58  Pulse: 77  Temp: 97.9  F (36.6  C)  Resp: 18  Height: 188 cm (6' 2\")  Weight: 81.6 kg (180 lb)  SpO2: 97 %    GEN: Awake, alert, and cooperative.  Does not appear to be distressed  HENT: Mild erythema across soft palate and tonsillar pillars " without any exudates or edema.  EYES: EOM intact. Conjunctiva clear. No discharge.   NECK: Symmetric, freely mobile.  No anterior cervical lymphadenopathy  CV : Extremities warm and well perfused.  PULM: Normal effort. Speaking in full sentences.  No rales rhonchi or wheezing  NEURO: Normal speech. Following commands.  Answering questions and interacting appropriately.   EXT: No gross deformity.   INT: Warm. No diaphoresis. Normal color.     ED Course        Procedures                 Critical Care time:  none              Results for orders placed or performed during the hospital encounter of 01/14/25 (from the past 24 hours)   Group A Streptococcus PCR Throat Swab    Specimen: Throat; Swab   Result Value Ref Range    Group A strep by PCR Not Detected Not Detected    Narrative    The Xpert Xpress Strep A test, performed on the [facility], is a rapid, qualitative in vitro diagnostic test for the detection of Streptococcus pyogenes (Group A ß-hemolytic Streptococcus, Strep A) in throat swab specimens from patients with signs and symptoms of pharyngitis. The Xpert Xpress Strep A test can be used as an aid in the diagnosis of Group A Streptococcal pharyngitis. The assay is not intended to monitor treatment for Group A Streptococcus infections. The Xpert Xpress Strep A test utilizes an automated real-time polymerase chain reaction (PCR) to detect Streptococcus pyogenes DNA.       Medications - No data to display    Assessments & Plan (with Medical Decision Making)   24 year old male with 1 day of sore throat detailed above.  Strep testing negative.  Likely caused by self-limiting viral illness.  There is a large prevalence of influenza create this time.  Did offer strep and COVID testing patient declined.  Patient is well-appearing and no feel any further workup essential.  Discussed symptomatic cares. Discussed that although etiology is unknown, likely contagious.          I have reviewed the nursing  notes.         New Prescriptions    No medications on file       Final diagnoses:   Acute pharyngitis, unspecified etiology     Johnnie Humphrey MD        1/14/2025   [facility] EMERGENCY DEPT    Disclaimer: This note consists of words and symbols derived from keyboarding and dictation using voice recognition software.  As a result, there may be errors that have gone undetected.  Please consider this when interpreting information found in this note.               Johnnie Humphrey MD  01/14/25 0912

## 2025-06-06 ENCOUNTER — HOSPITAL ENCOUNTER (EMERGENCY)
Facility: CLINIC | Age: 25
Discharge: HOME | End: 2025-06-06
Attending: EMERGENCY MEDICINE | Admitting: EMERGENCY MEDICINE
Payer: COMMERCIAL

## 2025-06-06 ENCOUNTER — APPOINTMENT (OUTPATIENT)
Dept: GENERAL RADIOLOGY | Facility: CLINIC | Age: 25
End: 2025-06-06
Attending: EMERGENCY MEDICINE
Payer: COMMERCIAL

## 2025-06-06 VITALS
RESPIRATION RATE: 16 BRPM | WEIGHT: 180 LBS | OXYGEN SATURATION: 98 % | BODY MASS INDEX: 23.1 KG/M2 | DIASTOLIC BLOOD PRESSURE: 75 MMHG | HEIGHT: 74 IN | SYSTOLIC BLOOD PRESSURE: 118 MMHG | HEART RATE: 59 BPM | TEMPERATURE: 97.8 F

## 2025-06-06 DIAGNOSIS — J06.9 URI WITH COUGH AND CONGESTION: ICD-10-CM

## 2025-06-06 LAB
FLUAV RNA SPEC QL NAA+PROBE: NEGATIVE
FLUBV RNA RESP QL NAA+PROBE: NEGATIVE
RSV RNA SPEC NAA+PROBE: NEGATIVE
SARS-COV-2 RNA RESP QL NAA+PROBE: NEGATIVE

## 2025-06-06 PROCEDURE — 87637 SARSCOV2&INF A&B&RSV AMP PRB: CPT | Performed by: EMERGENCY MEDICINE

## 2025-06-06 PROCEDURE — 99284 EMERGENCY DEPT VISIT MOD MDM: CPT | Mod: 25 | Performed by: EMERGENCY MEDICINE

## 2025-06-06 PROCEDURE — 99283 EMERGENCY DEPT VISIT LOW MDM: CPT | Performed by: EMERGENCY MEDICINE

## 2025-06-06 PROCEDURE — 71046 X-RAY EXAM CHEST 2 VIEWS: CPT

## 2025-06-06 ASSESSMENT — ACTIVITIES OF DAILY LIVING (ADL)
ADLS_ACUITY_SCORE: 46
ADLS_ACUITY_SCORE: 46

## 2025-06-06 NOTE — ED TRIAGE NOTES
Congestion, body aches, cough, some chills; ill since yesterday; coworker also ill.      Triage Assessment (Adult)       Row Name 06/06/25 0958          Triage Assessment    Airway WDL WDL        Cardiac WDL    Cardiac WDL WDL        Cognitive/Neuro/Behavioral WDL    Cognitive/Neuro/Behavioral WDL WDL

## 2025-06-06 NOTE — ED PROVIDER NOTES
History     Chief Complaint   Patient presents with    Nasal Congestion     Congestion, body aches, cough, some chills; ill since yesterday; coworker also ill.      HPI  Martir Angeles is a 24 year old male with no significant past medical history presents emergency department complaining of cough congestion patient and bodyaches.  Patient states yesterday morning developed out of it and was tired and fatigued began having a nonproductive cough which became productive today.  States he slept a lot yesterday and continued to have the cough and congestion began having increasing body aches today he was at work and was told to come in for further evaluation and care mostly brings up some mucus with his coughing denies any headache or visual changes is not any neck pain denies any abdominal pain or back pain is not any focal numbness weakness any extremity denies any bowel or bladder dysfunction.    Allergies:  No Known Allergies    Problem List:    There are no active problems to display for this patient.       Past Medical History:    Past Medical History:   Diagnosis Date    Unspecified closed fracture of ankle 2004       Past Surgical History:    No past surgical history on file.    Family History:    Family History   Problem Relation Age of Onset    Hypertension Maternal Grandmother     Thyroid Disease Maternal Grandmother     Thyroid Disease Maternal Grandfather     Diabetes Maternal Grandfather        Social History:  Marital Status:  Single [1]  Social History     Tobacco Use    Smoking status: Passive Smoke Exposure - Never Smoker    Smokeless tobacco: Never    Tobacco comments:     occ. exposure   Substance Use Topics    Alcohol use: No    Drug use: No        Medications:    clindamycin-benzoyl peroxide (BENZACLIN) 1-5 % external gel  doxycycline hyclate (VIBRAMYCIN) 50 MG capsule  Multiple Vitamins-Minerals (MULTIVITAMIN ADULTS 50+) TABS          Review of Systems  As per HPI.  Physical Exam   BP:  "118/75  Pulse: 59  Temp: 97.8  F (36.6  C)  Resp: 16  Height: 188 cm (6' 2\")  Weight: 81.6 kg (180 lb)  SpO2: 98 %      Physical Exam  Vitals and nursing note reviewed.   Constitutional:       General: He is not in acute distress.     Appearance: Normal appearance. He is not ill-appearing, toxic-appearing or diaphoretic.   HENT:      Head: Normocephalic and atraumatic.      Nose: Nose normal.      Mouth/Throat:      Mouth: Mucous membranes are moist.      Pharynx: Oropharynx is clear.      Comments: Posterior pharynx without erythema edema.  Eyes:      Conjunctiva/sclera: Conjunctivae normal.   Cardiovascular:      Rate and Rhythm: Normal rate and regular rhythm.      Pulses: Normal pulses.      Heart sounds: Normal heart sounds.   Pulmonary:      Comments: Breath sounds equal bilaterally with faint crackle at right base no rhonchi wheezing or rales are heard.  Abdominal:      General: Abdomen is flat. There is no distension.      Palpations: Abdomen is soft.      Tenderness: There is no abdominal tenderness. There is no right CVA tenderness or left CVA tenderness.   Musculoskeletal:         General: Normal range of motion.      Cervical back: Normal range of motion and neck supple.      Right lower leg: No edema.      Left lower leg: No edema.   Skin:     General: Skin is warm and dry.      Findings: No rash.   Neurological:      General: No focal deficit present.      Mental Status: He is alert and oriented to person, place, and time.      Sensory: No sensory deficit.      Motor: No weakness.      Coordination: Coordination normal.   Psychiatric:         Mood and Affect: Mood normal.         ED Course        Procedures              Critical Care time:  none     None         Results for orders placed or performed during the hospital encounter of 06/06/25 (from the past 24 hours)   Influenza A/B, RSV and SARS-CoV2 PCR (COVID-19) Nose    Specimen: Nose; Swab   Result Value Ref Range    Influenza A PCR Negative " Negative    Influenza B PCR Negative Negative    RSV PCR Negative Negative    SARS CoV2 PCR Negative Negative    Narrative    Testing was performed using the Xpert Xpress CoV2/Flu/RSV Assay on the [employer]. This test should be ordered for the detection of SARS-CoV2, influenza, and RSV viruses in individuals with signs and symptoms of respiratory tract infection. This test is for in vitro diagnostic use under the [place] FDA for laboratories certified under CLIA to perform high or moderate complexity testing. This test has been [place] FDA cleared. A negative result does not rule out the presence of PCR inhibitors in the specimen or target RNA in concentration below the limit of detection for the assay. If only one viral target is positive but coinfection with multiple targets is suspected, the sample should be re-tested with another FDA cleared, approved, or authorized test, if coninfection would change clinical management. This test was validated by the [facility] [employer]. These laboratories are certified under the Clinical Laboratory Improvement Amendments of 1988 (CLIA-88) as qualified to perfom high complexity laboratory testing.   Chest XR,  PA & LAT    Narrative    EXAM: XR CHEST 2 VIEWS  LOCATION: [facility]  DATE: 6/6/2025    INDICATION: cough;  COMPARISON: 11/18/2008      Impression    IMPRESSION: Negative chest.       Medications - No data to display    Assessments & Plan (with Medical Decision Making) records reviewed including past medical history medications and allergies.  ED visit on 1/14/2025 for acute pharyngitis was reviewed.  ED visit for acute pharyngitis on 12/13/2024 was reviewed.  COVID influenza RSV were negative.  Chest x-ray was obtained due to patient's cough and we reviewed interpret this and revealed a negative chest.  Without infiltrate cardiomegaly or other abnormality.  I feel is more likely a viral illness and patient should take  over-the-counter medications for symptom symptomatic relief and return if worsening symptoms including fever not controlled with ibuprofen or Tylenol worsening shortness of breath chest pain vomiting decreased urine output weakness or other concerns.  Patient feels comfortable with this plan at this time.     I have reviewed the nursing notes.    I have reviewed the findings, diagnosis, plan and need for follow up with the patient.             Discharge Medication List as of 6/6/2025 11:16 AM          Final diagnoses:   URI with cough and congestion       6/6/2025   [facility] EMERGENCY DEPT       Gucci Dow MD  06/06/25 6003

## 2025-06-06 NOTE — Clinical Note
Martir Angeles was seen and treated in our emergency department on 6/6/2025.  He may return to work on 06/07/2025.       If you have any questions or concerns, please don't hesitate to call.      Gucci Dow MD

## 2025-06-13 NOTE — DISCHARGE INSTRUCTIONS
Return if symptoms worsen or new symptoms develop.  Follow-up with primary care physician next available.  Drink plenty of fluids.  Take over-the-counter decongestant and as needed.  No evidence of bacterial infection is present at this time.    12-Jun-2025 04:04